# Patient Record
Sex: MALE | Race: WHITE
[De-identification: names, ages, dates, MRNs, and addresses within clinical notes are randomized per-mention and may not be internally consistent; named-entity substitution may affect disease eponyms.]

---

## 2017-03-29 ENCOUNTER — HOSPITAL ENCOUNTER (EMERGENCY)
Dept: HOSPITAL 11 - JP.ED | Age: 67
Discharge: HOME | End: 2017-03-29
Payer: MEDICARE

## 2017-03-29 VITALS — DIASTOLIC BLOOD PRESSURE: 78 MMHG | SYSTOLIC BLOOD PRESSURE: 127 MMHG

## 2017-03-29 DIAGNOSIS — Z79.899: ICD-10-CM

## 2017-03-29 DIAGNOSIS — R20.0: Primary | ICD-10-CM

## 2017-03-29 DIAGNOSIS — K21.9: ICD-10-CM

## 2017-03-29 DIAGNOSIS — E03.9: ICD-10-CM

## 2017-03-29 DIAGNOSIS — Z90.49: ICD-10-CM

## 2017-03-29 DIAGNOSIS — Z88.2: ICD-10-CM

## 2017-03-29 DIAGNOSIS — Z98.84: ICD-10-CM

## 2017-03-29 DIAGNOSIS — Z98.890: ICD-10-CM

## 2017-03-29 DIAGNOSIS — F17.210: ICD-10-CM

## 2017-03-29 NOTE — EDM.PDOC
ED HPI GENERAL MEDICAL PROBLEM





- General


Chief Complaint: General


Stated Complaint: FACE BECAME NUMB ON SUNDAY AND STILL SOME WEAKNESS


Time Seen by Provider: 03/29/17 11:46


Source of Information: Reports: Patient, Family, RN notes reviewed


History Limitations: Reports: No limitations





- History of Present Illness


INITIAL COMMENTS - FREE TEXT/NARRATIVE: 





66-year-old gentleman presents emergency department today with complaint of left

-sided facial numbness, states his symptoms initially started on Sunday he did 

have a headache at that time he describes as sharp and electric headache is now 

resolved he is very specific about the facial numbness is on half of his face 

left side above the lip and slightly below the nose does not incorporate the 

ear he says at certain points when he strokes his beard it can be very painful.

  No rash he has never had shingles before, does not complain of any focal 

neural deficits


  ** Left Hip


Pain Score (Numeric/FACES): 8





- Related Data


 Allergies











Allergy/AdvReac Type Severity Reaction Status Date / Time


 


Sulfa (Sulfonamide AdvReac  Hallucinati Verified 12/12/16 07:21





Antibiotics)   ons  











Home Meds: 


 Home Meds





Ca Carbonate/Vitamin D3/Vit K [Calcium + D Soft Chewable Tab] 1 tab PO BID 01/20

/15 [History]


Cyanocobalamin (Vitamin B-12) [B-12] 2,500 mcg PO DAILY 01/20/15 [History]


Gabapentin [Neurontin] 1,200 mg PO TID 01/20/15 [History]


Levothyroxine 125 mcg PO ACBRK 01/20/15 [History]


Multivitamin with Minerals [Multivitamins with Minerals] 1 each PO BID 01/20/15 

[History]


Sertraline [Zoloft] 100 mg PO DAILY 01/20/15 [History]


Vitamin B Complex [B Complex] 1 tab PO DAILY 01/20/15 [History]


DULoxetine HCl [Cymbalta] 90 mg PO DAILY 03/31/16 [History]


Doxepin [SINEquan] 10 mg PO BEDTIME 03/31/16 [History]


Finasteride [Proscar] 5 mg PO BEDTIME 03/31/16 [History]


Omeprazole [Omeprazole] 40 mg PO DAILY 03/31/16 [History]


Polyethylene Glycol 3350 [Miralax] 238 gm PO DAILY PRN 03/31/16 [History]


traMADol [Ultram] 50 - 100 mg PO Q6H PRN 03/31/16 [History]


Cyclobenzaprine [Flexeril] 10 mg PO TID PRN 12/08/16 [History]


L.acidoph,Paracasei, B.lactis [Probiotic] 1 cap PO DAILY 12/08/16 [History]


traZODone 50 - 100 mg PO BEDTIME 12/08/16 [History]











Past Medical History


HEENT History: Reports: Impaired vision, Other (see below)


Other HEENT History: wears glasses, infection in right submandibular gland


Gastrointestinal History: Reports: Bowel obstruction, Cholelithiasis, Colon 

polyp, Gastritis, GERD


Genitourinary History: Reports: Prostate disorder


Musculoskeletal History: Reports: Arthritis, Back pain, chronic, Fracture, Neck 

pain, chronic, Other (see below)


Other Musculoskeletal History: polyneuropathy


Neurological History: Reports: Concussion, CVA


Endocrine/Metabolic History: Reports: Hypothyroidism


Hematologic History: Reports: B12 deficiency





- Infectious Disease History


Infectious Disease History: Reports: Chicken pox, Measles


Other Infectious Disease History: MRSA 2012





- Past Surgical History


Head Surgeries/Procedures: Reports: None


HEENT Surgical History: Reports: Oral surgery


Cardiovascular Surgical History: Reports: Other (see below)


Other Cardiovascular Surgeries/Procedures: angiogram 2010


GI Surgical History: Reports: Bariatric procedure, Cholecystectomy, Colonoscopy

, EGD, Hernia, abdominal, Polypectomy, Small bowel, Other (see below)


Other GI Surgeries/Procedures: panniculectomy   bowel resection


Endocrine Surgical History: Reports: None


Neurological Surgical History: Reports: Spinal fusion, Other (see below)


Other Neurological Surgeries/Procedures: radiofrequency ablation neck ,     L4-

S1


Musculoskeletal Surgical History: Reports: Carpal tunnel, Other (see below)


Other Musculoskeletal Surgeries/Procedures:: rotator cuff ,  CTR,  elbow 

surgery   pin left big toe


Dermatological Surgical History: Reports: None





Social & Family History





- Family History


HEENT: Reports: None


Cardiac: Reports: Heart failure, Hypertension


Respiratory: Reports: Asthma, Other (see below)


Other Respiratory Family Hisory: emphysema


Oncologic: Reports: Breast, Lung, Lymphoma, Prostate, Other (see below)


Other Oncologic Family History: stomach





- Tobacco Use


Smoking Status *Q: Current Every Day Smoker


Years of Tobacco use: 50


Packs/Tins Daily: 1


Used Tobacco, but Quit: No


Second Hand Smoke Exposure: Yes





- Caffeine Use


Caffeine Use: Reports: Coffee


Other Caffeine Use: 2 pots a days





- Alcohol Use


Days Per Week of Alcohol Use: 0





- Recreational Drug Use


Recreational Drug Use: No





ED ROS GENERAL





- Review of Systems


Review Of Systems: See Below


Constitutional: Reports: no symptoms


HEENT: Reports: No symptoms


Respiratory: Reports: No Symptoms


Cardiovascular: Reports: No symptoms


GI/Abdominal: Reports: No symptoms


: Reports: no symptoms


Musculoskeletal: Reports: no symptoms


Neurological: Reports: Headache (Now resolve), Numbness, Tingling





ED EXAM, GENERAL





- Physical Exam


Exam: See Below


Free Text/Narrative:: 





Examination of the face I don't appreciate any rash very specific about the 

numbness tingling area is on the left side of the face top half of the lip is 

included bottom half is not inferior aspect of the nose up to the corner of the 

left eye the ear is spared and the remainder of the integument system is 

included consistent with a V2 dermatome


Exam Limited By: No limitations


General Appearance: alert, WD/WN, no apparent distress


Eye Exam: bilateral eye: EOMI, normal fundi, normal inspection


Nose: normal inspection, normal mucosa, no blood


Throat/Mouth: Normal inspection, Normal lips, Normal teeth, Normal gums, Normal 

oropharynx, Normal voice, No airway compromise


Head: atraumatic, normocephalic


Neck: normal inspection, supple, non-tender, full range of motion


Respiratory/Chest: no respiratory distress, lungs clear, normal breath sounds, 

no accessory muscle use


Cardiovascular: regular rate, rhythm, no murmur


Neurological: alert, oriented, CN II-XII intact, normal cognition, normal gait, 

no motor/sensory deficits





Course





- Vital Signs


Last Recorded V/S: 





 Last Vital Signs











Temp  98.6 F   03/29/17 11:04


 


Pulse  55 L  03/29/17 11:04


 


Resp  12   03/29/17 11:04


 


BP  127/78   03/29/17 11:04


 


Pulse Ox  98   03/29/17 11:04














Departure





- Departure


Time of Disposition: 12:18


Disposition: Home, Self-Care 01


Condition: good


Clinical Impression: 


 Facial numbness





Forms:  ED Department Discharge


Additional Instructions: 


Continue to watchful waiting, any sign of a rash develops please start the 

Valtrex, any vision symptoms that developed or become worse please follow up 

with her eye care provider, recommend recheck by your primary care provider in 

the next 2-3 days, call or return to the ED with worsening of symptoms





- Assessment/Plan


Plan: 





Assessment





Acuity = acute





Site and laterality = suspicious for early shingles given the specificity of 

the V2 involvement





Etiology  = probable varicella-zoster





Manifestations = numbness, tingling, pain V2 distribution left side





Location of injury =  home





Lab values = none





Plan


Prescription written for Valtrex 1 g by mouth 3 times a day x7 days and asked 

him to do close followup with his eye care provider if any symptoms develop, he 

is going to wait to start the prescription until he gets excited or rash, 

otherwise followup with primary care in 2-3 days for reevaluation

















Patient was in agreement with the plan all questions were answered, they were 

instructed to return to the emergency department or call for worsening 

symptoms.  This note was dictated using dragon voice recognition software 

please call with any questions.

## 2018-05-25 ENCOUNTER — HOSPITAL ENCOUNTER (EMERGENCY)
Dept: HOSPITAL 11 - JP.ED | Age: 68
Discharge: HOME | End: 2018-05-25
Payer: MEDICARE

## 2018-05-25 VITALS — DIASTOLIC BLOOD PRESSURE: 68 MMHG | SYSTOLIC BLOOD PRESSURE: 122 MMHG

## 2018-05-25 DIAGNOSIS — Z88.2: ICD-10-CM

## 2018-05-25 DIAGNOSIS — S93.401A: Primary | ICD-10-CM
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
## 2018-05-25 DIAGNOSIS — Z79.899: ICD-10-CM

## 2018-05-25 DIAGNOSIS — S83.91XA: ICD-10-CM

## 2018-05-25 DIAGNOSIS — E03.9: ICD-10-CM

## 2018-05-25 DIAGNOSIS — F17.210: ICD-10-CM

## 2018-05-25 PROCEDURE — 99284 EMERGENCY DEPT VISIT MOD MDM: CPT

## 2018-05-25 PROCEDURE — 96372 THER/PROPH/DIAG INJ SC/IM: CPT

## 2018-05-25 PROCEDURE — 73610 X-RAY EXAM OF ANKLE: CPT

## 2018-05-25 PROCEDURE — 73562 X-RAY EXAM OF KNEE 3: CPT

## 2018-05-25 NOTE — EDM.PDOC
ED HPI GENERAL MEDICAL PROBLEM





- General


Chief Complaint: Lower Extremity Injury/Pain


Stated Complaint: BOBCAT ACCIDENT


Time Seen by Provider: 05/25/18 15:45


Source of Information: Reports: Patient, EMS, Family


History Limitations: Reports: No Limitations





- History of Present Illness


INITIAL COMMENTS - FREE TEXT/NARRATIVE: 





Marvin presents today with complaints of right knee and right ankle pain after 

slipping and falling off his bobcat while hauling wood today at about 1400.


He reports he is not able to bear weight on his right foot. 


  ** Right Knee


Pain Score (Numeric/FACES): 8





- Related Data


 Allergies











Allergy/AdvReac Type Severity Reaction Status Date / Time


 


Sulfa (Sulfonamide AdvReac  Hallucinati Verified 05/25/18 15:36





Antibiotics)   ons  











Home Meds: 


 Home Meds





Ca Carbonate/Vitamin D3/Vit K [Calcium + D Soft Chewable Tab] 1 tab PO BID 01/20

/15 [History]


Cyanocobalamin (Vitamin B-12) [B-12] 2,500 mcg PO DAILY 01/20/15 [History]


Gabapentin [Neurontin] 1,200 mg PO TID 01/20/15 [History]


Levothyroxine 125 mcg PO ACBRK 01/20/15 [History]


Multivitamin with Minerals [Multivitamins with Minerals] 1 each PO BID 01/20/15 

[History]


Sertraline [Zoloft] 100 mg PO DAILY 01/20/15 [History]


Vitamin B Complex [B Complex] 1 tab PO DAILY 01/20/15 [History]


DULoxetine HCl [Cymbalta] 90 mg PO DAILY 03/31/16 [History]


Doxepin [SINEquan] 10 mg PO BEDTIME 03/31/16 [History]


Finasteride [Proscar] 5 mg PO BEDTIME 03/31/16 [History]


Omeprazole 40 mg PO DAILY 03/31/16 [History]


Polyethylene Glycol 3350 [Miralax] 238 gm PO DAILY PRN 03/31/16 [History]


traMADol [Ultram] 50 - 100 mg PO Q6H PRN 03/31/16 [History]


Cyclobenzaprine [Flexeril] 10 mg PO TID PRN 12/08/16 [History]


L.acidoph,Paracasei, B.lactis [Probiotic] 1 cap PO DAILY 12/08/16 [History]


traZODone 50 - 100 mg PO BEDTIME 12/08/16 [History]











Past Medical History


HEENT History: Reports: Impaired Vision, Other (See Below)


Other HEENT History: wears glasses, infection in right submandibular gland


Cardiovascular History: Reports: None


Respiratory History: Reports: Sleep Apnea


Gastrointestinal History: Reports: Bowel Obstruction, Cholelithiasis, Colon 

Polyp, Gastritis, GERD


Genitourinary History: Reports: Prostate Disorder


Musculoskeletal History: Reports: Arthritis, Back Pain, Chronic, Fracture, Neck 

Pain, Chronic


Other Musculoskeletal History: polyneuropathy


Neurological History: Reports: CVA


Endocrine/Metabolic History: Reports: Hypothyroidism


Hematologic History: Reports: B12 Deficiency





- Infectious Disease History


Infectious Disease History: Reports: Chicken Pox, Measles


Other Infectious Disease History: MRSA 2012





- Past Surgical History


HEENT Surgical History: Reports: Oral Surgery


GI Surgical History: Reports: Bariatric Procedure, Cholecystectomy, Colon, 

Colonoscopy, EGD, Hernia, Abdominal, Polypectomy, Small Bowel, Other (See Below)


Other GI Surgeries/Procedures: colon resection


Male  Surgical History: Reports: TURP-Transurethral Resection of Prostate


Neurological Surgical History: Reports: Spinal Fusion


Musculoskeletal Surgical History: Reports: Carpal Tunnel





Social & Family History





- Family History


HEENT: Reports: None


Cardiac: Reports: Heart Failure, Hypertension


Respiratory: Reports: Asthma, Other (See Below)


Other Respiratory Family Hisory: emphysema


Oncologic: Reports: Breast, Lung, Lymphoma, Prostate, Other (See Below)


Other Oncologic Family History: stomach





- Tobacco Use


Smoking Status *Q: Heavy Tobacco Smoker


Years of Tobacco use: 50


Packs/Tins Daily: 1





- Caffeine Use


Caffeine Use: Reports: Coffee


Other Caffeine Use: 2 pots a days





- Recreational Drug Use


Recreational Drug Use: No





Review of Systems





- Review of Systems


Review Of Systems: See Below


Constitutional: Reports: No Symptoms


Eyes: Reports: No Symptoms


Ears: Reports: No Symptoms


Nose: Reports: No Symptoms


Mouth/Throat: Reports: No Symptoms


Respiratory: Reports: No Symptoms


Cardiovascular: Reports: No Symptoms


GI/Abdominal: Reports: No Symptoms


Genitourinary: Reports: No Symptoms


Musculoskeletal: Reports: Foot Pain, Joint Pain, Other (Right knee and ankle 

pain)


Skin: Reports: No Symptoms


Neurological: Reports: Other (He suffers from chronic polyneuropathy, numbness 

to feet. )


Psychiatric: Reports: No Symptoms





ED EXAM, GENERAL





- Physical Exam


Exam: See Below


Free Text/Narrative:: 





Mr. Lawrence presents today with complaints of pain to right knee and ankle after 

twisting injury when he slipped and fell on his bobcat at 1400 today.  He 

denies LOC, head injury or other concerns.  He reports use of oxycontin works 

good for his pain. 


General Appearance: Alert, WD/WN, Moderate Distress


Eye Exam: Bilateral Eye: EOMI, Normal Inspection, PERRL


Ears: Normal External Exam, Normal Canal, Hearing Grossly Normal, Normal TMs


Ear Exam: Bilateral Ear: TM normal


Nose: Normal Inspection, Normal Mucosa, No Blood


Throat/Mouth: Normal Inspection, Normal Lips, Normal Gums, Normal Oropharynx, 

Normal Voice, No Airway Compromise


Head: Atraumatic, Normocephalic


Neck: Normal Inspection, Supple, Non-Tender, Full Range of Motion.  No: 

Lymphadenopathy (R), Lymphadenopathy (L)


Respiratory/Chest: No Respiratory Distress, Lungs Clear, Normal Breath Sounds, 

No Accessory Muscle Use, Chest Non-Tender


Cardiovascular: Normal Peripheral Pulses, Regular Rate, Rhythm, No Edema, No 

Gallop, No Murmur, No Rub


Peripheral Pulses: 2+: Radial (L), Radial (R), Dorsalis Pedis (L), Dorsalis 

Pedis (R)


GI/Abdominal: Normal Bowel Sounds, Soft, Non-Tender, No Organomegaly, No 

Distention, No Mass, Pelvis Stable


Back Exam: Normal Inspection, Full Range of Motion.  No: CVA Tenderness (R), 

CVA Tenderness (L)


Extremities: No Pedal Edema, Normal Capillary Refill, Leg Pain, Limited Range 

of Motion, Other (Pain to medial right knee and medial right ankle.  Increase 

in pain with movement, flexion and extension.  ).  No: Increased Warmth, Mottled

, Redness


Neurological: Alert, Oriented, CN II-XII Intact, Normal Cognition, Normal 

Reflexes, No Motor/Sensory Deficits


Psychiatric: Normal Affect, Normal Mood


Skin Exam: Warm, Dry, Intact, Normal Color, No Rash.  No: Ecchymosis, Erythema, 

Increased Warmth


Lymphatic: No Adenopathy





Course





- Vital Signs


Last Recorded V/S: 


 Last Vital Signs











Temp  36.7 C   05/25/18 15:33


 


Pulse  58 L  05/25/18 15:33


 


Resp  18   05/25/18 15:33


 


BP  122/68   05/25/18 15:33


 


Pulse Ox  96   05/25/18 15:33














- Orders/Labs/Meds


Orders: 


 Active Orders 24 hr











 Category Date Time Status


 


 Ankle Min 3V Rt [CR] Stat Exams  05/25/18 16:06 Taken


 


 Knee 3V Rt [CR] Stat Exams  05/25/18 16:06 Taken











Meds: 


Medications














Discontinued Medications














Generic Name Dose Route Start Last Admin





  Trade Name Abdoulaye  PRN Reason Stop Dose Admin


 


Hydrocodone Bitart/Acetaminophen  1 tab  05/25/18 17:19  





  Norco 325-5 Mg  PO  05/25/18 17:20  





  ONETIME ONE   





     





     





     





     


 


Hydromorphone HCl  1 mg  05/25/18 16:06  05/25/18 16:22





  Dilaudid  IM  05/25/18 16:07  1 mg





  ONETIME ONE   Administration





     





     





     





     














- Radiology Interpretation


Free Text/Narrative:: 





X-rays of right knee and right ankle reviewed.


No acute findings noted.


X-rays reviewed with Dr. Colón, she is in agreement.





Radiologist read pending. 








Departure





- Departure


Time of Disposition: 17:29


Disposition: Home, Self-Care 01


Condition: Good


Clinical Impression: 


 Right ankle sprain, Right knee pain








- Discharge Information


Instructions:  Ankle Sprain, Easy-to-Read, Knee Sprain, Adult, Easy-to-Read


Referrals: 


Maksim Perez NP [Primary Care Provider] - 


Forms:  ED Department Discharge


Additional Instructions: 


You have been evaluated and treated for right knee sprain and right ankle 

sprain in the emergency room


Secondary to fall from Bobcat.





Rest, ice, elevation and compression will help the best. 





Keep the right leg elevated to minimize swelling and pain. 





Use ace wraps for compression to help with swelling and pain.





Take ibuprofen 600mg to 800mg by mouth three times a day as needed for pain.


You can also take acetaminophen 1000mg by mouth three times a day for pain. 





Wear Cam-walker boot for ankle sprain. 





Hard copy script provided for short right knee stabilizer - neoprene.  Fill 

this at nearest pharmacy for support.





Use a walker to assist with ambulation until pain decreases. 





Follow up with Dr. AJ Herndon ORTHO next week for further management.





Return for worsening, issues or concerns. 





- My Orders


Last 24 Hours: 


My Active Orders





05/25/18 16:06


Ankle Min 3V Rt [CR] Stat 


Knee 3V Rt [CR] Stat 














- Assessment/Plan


Last 24 Hours: 


My Active Orders





05/25/18 16:06


Ankle Min 3V Rt [CR] Stat 


Knee 3V Rt [CR] Stat 











Assessment:: 





Right knee sprain


Right ankle sprain


Plan: 





 Patient evaluated and treated for right knee sprain and right ankle sprain in 

the emergency room


Secondary to fall from Bobcat.





Rest, ice, elevation and compression will help the best. 





Keep the right leg elevated to minimize swelling and pain. 





Use ace wraps for compression to help with swelling and pain.





Take ibuprofen 600mg to 800mg by mouth three times a day as needed for pain.


He can also take acetaminophen 1000mg by mouth three times a day for pain. 





Wear Cam-walker boot for ankle sprain. 





Hard copy script provided for short right knee stabilizer - neoprene.  Fill 

this at nearest pharmacy for support.





Use a walker to assist with ambulation until pain decreases. 





Follow up with Dr. AJ Herndon ORTHO next week for further management.





Return for worsening, issues or concerns.

## 2018-05-29 NOTE — CR
Findings: Degenerative changes at the ankle. Osteophyte or loose body at the talar neck dorsal aspect
. Calcification of the plantar fascia and Achilles tendon. No fracture.

## 2018-05-29 NOTE — CR
Large effusion or synovitis. Chondrocalcinosis. Arthritic change greatest at the patellofemoral tyler
rtment. No fracture.

## 2018-06-25 ENCOUNTER — HOSPITAL ENCOUNTER (OUTPATIENT)
Dept: HOSPITAL 11 - JP.SDS | Age: 68
Discharge: HOME | End: 2018-06-25
Attending: SURGERY
Payer: MEDICARE

## 2018-06-25 VITALS — SYSTOLIC BLOOD PRESSURE: 135 MMHG | DIASTOLIC BLOOD PRESSURE: 76 MMHG

## 2018-06-25 DIAGNOSIS — R12: ICD-10-CM

## 2018-06-25 DIAGNOSIS — R10.13: Primary | ICD-10-CM

## 2018-06-25 PROCEDURE — 0DJ08ZZ INSPECTION OF UPPER INTESTINAL TRACT, VIA NATURAL OR ARTIFICIAL OPENING ENDOSCOPIC: ICD-10-PCS | Performed by: SURGERY

## 2018-06-25 PROCEDURE — 43235 EGD DIAGNOSTIC BRUSH WASH: CPT

## 2018-06-26 NOTE — OR
DATE OF PROCEDURE:  06/25/2018

 

PREOPERATIVE DIAGNOSES:  Epigastric pain and heartburn.

 

POSTOPERATIVE DIAGNOSES:  Epigastric pain and heartburn, status post Owen-en-Y gastric

bypass with;

1. Possible thrush over tongue.

2. Otherwise normal examination.

 

OPERATIVE PROCEDURE:  Upper GI endoscopy.

 

ANESTHESIA:  IV sedation.

 

INDICATION FOR PROCEDURE:  The patient presents with some complaints of epigastric

discomfort and heartburn, along with a dry and somewhat thickened feeling over this tongue.

The patient has been on omeprazole 40 mg daily.  He had been on Carafate, but found that not

tolerable.  The plan is to proceed with upper GI endoscopy with biopsies as indicated.

Potential risks including bleeding and perforation were discussed, and the patient wishes to

proceed.

 

DETAILS OF PROCEDURE:  The patient was taken to the operating room and placed in a left

lateral decubitus position.  IV sedation was administered, after which the upper GI

endoscope was passed orally through the length of the esophagus and into the gastric pouch

and from there through the gastrojejunostomy roughly 30 cm further into the Owen limb.  The

patient was noted to have a thickened and somewhat widened area over the surface of the

tongue, otherwise, subsequent examination below that was entirely normal.  There were no

abnormalities of the hypopharynx, larynx, or upper esophageal sphincter.  The esophageal

body and EG junction were entirely free of any gross inflammation.  The gastric pouch

likewise was not inflamed, and there was no stricturing or problems noted at the

gastrojejunostomy.  The mucosa throughout this entire exam was entirely normal below the

tongue.  Passing the scope quite a bit into the Owen limb, there was no bile or other signs

of partial small bowel obstruction distally.  The scope was then withdrawn, and the

procedure then concluded.  The patient was taken to the recovery room in a satisfactory

condition.

 

The patient will be given a course of Mycostatin swish and swallow 5 mL q.i.d. for 5 days

for possible thrush.  Otherwise, continue the present medications.  He will be following up

with Keeley Banda in one week.  If he continues to have symptoms of heartburn or dysphasia,

a course of Levsin may be warranted at that point.

 

The patient was also complaining of some nebulous symptoms suggestive of TIAs.  This

apparently has received some workup in the recent past, and Keeley Banda will be looking

into that in terms of what needs to be done in terms of further evaluation, workup and

referrals.

 

 

 

 

Kp Herndon MD

DD:  06/25/2018 19:19:36

DT:  06/26/2018 09:14:06

Job #:  [id number]

## 2019-03-08 ENCOUNTER — HOSPITAL ENCOUNTER (OUTPATIENT)
Dept: HOSPITAL 11 - JP.SDS | Age: 69
Discharge: HOME | End: 2019-03-08
Attending: SURGERY
Payer: MEDICARE

## 2019-03-08 VITALS — DIASTOLIC BLOOD PRESSURE: 72 MMHG | SYSTOLIC BLOOD PRESSURE: 161 MMHG

## 2019-03-08 DIAGNOSIS — F32.9: ICD-10-CM

## 2019-03-08 DIAGNOSIS — E03.9: ICD-10-CM

## 2019-03-08 DIAGNOSIS — K21.9: ICD-10-CM

## 2019-03-08 DIAGNOSIS — R11.0: Primary | ICD-10-CM

## 2019-03-08 DIAGNOSIS — F17.200: ICD-10-CM

## 2019-03-08 DIAGNOSIS — R63.0: ICD-10-CM

## 2019-03-08 DIAGNOSIS — C25.0: ICD-10-CM

## 2019-03-08 DIAGNOSIS — Z86.73: ICD-10-CM

## 2019-03-08 DIAGNOSIS — R63.4: ICD-10-CM

## 2019-03-08 DIAGNOSIS — Z98.84: ICD-10-CM

## 2019-03-08 PROCEDURE — C1751 CATH, INF, PER/CENT/MIDLINE: HCPCS

## 2019-03-08 PROCEDURE — 43239 EGD BIOPSY SINGLE/MULTIPLE: CPT

## 2019-03-08 PROCEDURE — 87081 CULTURE SCREEN ONLY: CPT

## 2019-03-15 NOTE — OR
DATE OF PROCEDURE:  03/08/2019

 

SURGEON:  Kp Herndon MD

 

PREOPERATIVE DIAGNOSIS:  History of pancreatic carcinoma with ongoing nausea and weight

loss.

 

POSTOPERATIVE DIAGNOSIS:  Normal endoscopic examination, status post Owen-en-Y gastric

bypass.

 

OPERATIVE PROCEDURE:  Upper GI endoscopy with biopsy of gastric pouch for CLOtest.

 

ANESTHESIA:  IV sedation.

 

INDICATION FOR PROCEDURE:  This is a 68-year-old, recently diagnosed with adenocarcinoma of

pancreatic head.  He is beginning neoadjuvant treatment next week.  He has had problems with

nausea and weight loss, and he is undergoing upper GI endoscopy for evaluation of his upper

GI status.  He is status post previous Owen-en-Y gastric bypass.  Potential risks including

bleeding and perforation were discussed, and the patient wishes to proceed.

 

DETAILS OF PROCEDURE:  The patient was taken to the operating room and placed in a left

lateral decubitus position.  IV sedation was administered, after which the upper GI

endoscope was passed orally through the length of the esophagus, through the esophagogastric

junction into the gastric pouch, and from there through the gastrojejunostomy roughly 20 cm

into the Owen limb.

 

Overall, there were no abnormalities at all during the examination.  There were no areas of

mucosal inflammation, no stricturing, and no reflux of bile or signs of more distal bowel

obstruction.  Biopsies were then obtained from the gastric pouch and sent for CLOtest for H.

pylori.  Minimal bleeding from the biopsy sites was seen, and the procedure was then

concluded.

 

At this point, the patient's nausea and anorexia are likely tumor related factors, as well

as his jaundiced state.  The plan will be to give the patient Zofran 4 mg ODT t.i.d. to be

taken around 30 minutes before meals.  We will also give an additional liter of lactate

Ringer's with MVI trace elements prior to discharge today.  He will be following with

Medical-Oncology next week.  The patient is quite jaundiced at this point, and the hope is

that the neoadjuvant treatment will treat the tumor enough that he opens up his common bile

duct to some extent.  If this fails to occur over the next relatively short period, one

might consider placing an endoscopic stent across the common bile duct to alleviate the

jaundice.

 

 

 

 

Kp Herndon MD

DD:  03/14/2019 11:43:10

DT:  03/14/2019 21:04:28

Job #:  [id number]

## 2019-04-03 ENCOUNTER — HOSPITAL ENCOUNTER (INPATIENT)
Dept: HOSPITAL 11 - JP.ED | Age: 69
LOS: 3 days | Discharge: HOME | DRG: 445 | End: 2019-04-06
Attending: INTERNAL MEDICINE | Admitting: INTERNAL MEDICINE
Payer: MEDICARE

## 2019-04-03 DIAGNOSIS — Z66: ICD-10-CM

## 2019-04-03 DIAGNOSIS — Z91.09: ICD-10-CM

## 2019-04-03 DIAGNOSIS — Z98.1: ICD-10-CM

## 2019-04-03 DIAGNOSIS — F41.9: ICD-10-CM

## 2019-04-03 DIAGNOSIS — R50.9: Primary | ICD-10-CM

## 2019-04-03 DIAGNOSIS — F17.210: ICD-10-CM

## 2019-04-03 DIAGNOSIS — G47.30: ICD-10-CM

## 2019-04-03 DIAGNOSIS — M54.9: ICD-10-CM

## 2019-04-03 DIAGNOSIS — M19.90: ICD-10-CM

## 2019-04-03 DIAGNOSIS — R11.0: ICD-10-CM

## 2019-04-03 DIAGNOSIS — N42.9: ICD-10-CM

## 2019-04-03 DIAGNOSIS — E03.9: ICD-10-CM

## 2019-04-03 DIAGNOSIS — Z79.82: ICD-10-CM

## 2019-04-03 DIAGNOSIS — Z86.14: ICD-10-CM

## 2019-04-03 DIAGNOSIS — Z92.21: ICD-10-CM

## 2019-04-03 DIAGNOSIS — K83.09: ICD-10-CM

## 2019-04-03 DIAGNOSIS — E53.8: ICD-10-CM

## 2019-04-03 DIAGNOSIS — H54.7: ICD-10-CM

## 2019-04-03 DIAGNOSIS — Z86.73: ICD-10-CM

## 2019-04-03 DIAGNOSIS — Z98.84: ICD-10-CM

## 2019-04-03 DIAGNOSIS — E78.00: ICD-10-CM

## 2019-04-03 DIAGNOSIS — M54.2: ICD-10-CM

## 2019-04-03 DIAGNOSIS — C25.0: ICD-10-CM

## 2019-04-03 DIAGNOSIS — G89.29: ICD-10-CM

## 2019-04-03 DIAGNOSIS — Z88.2: ICD-10-CM

## 2019-04-03 DIAGNOSIS — K21.9: ICD-10-CM

## 2019-04-03 PROCEDURE — 99285 EMERGENCY DEPT VISIT HI MDM: CPT

## 2019-04-03 PROCEDURE — 87804 INFLUENZA ASSAY W/OPTIC: CPT

## 2019-04-03 PROCEDURE — 71046 X-RAY EXAM CHEST 2 VIEWS: CPT

## 2019-04-03 PROCEDURE — 82150 ASSAY OF AMYLASE: CPT

## 2019-04-03 PROCEDURE — 81001 URINALYSIS AUTO W/SCOPE: CPT

## 2019-04-03 PROCEDURE — 83605 ASSAY OF LACTIC ACID: CPT

## 2019-04-03 PROCEDURE — 85025 COMPLETE CBC W/AUTO DIFF WBC: CPT

## 2019-04-03 PROCEDURE — 87040 BLOOD CULTURE FOR BACTERIA: CPT

## 2019-04-03 PROCEDURE — 36415 COLL VENOUS BLD VENIPUNCTURE: CPT

## 2019-04-03 PROCEDURE — C9113 INJ PANTOPRAZOLE SODIUM, VIA: HCPCS

## 2019-04-03 PROCEDURE — 86140 C-REACTIVE PROTEIN: CPT

## 2019-04-03 PROCEDURE — 83690 ASSAY OF LIPASE: CPT

## 2019-04-03 PROCEDURE — 80053 COMPREHEN METABOLIC PANEL: CPT

## 2019-04-03 PROCEDURE — 85610 PROTHROMBIN TIME: CPT

## 2019-04-03 PROCEDURE — 96365 THER/PROPH/DIAG IV INF INIT: CPT

## 2019-04-03 PROCEDURE — 83735 ASSAY OF MAGNESIUM: CPT

## 2019-04-03 NOTE — EDM.PDOC
ED HPI GENERAL MEDICAL PROBLEM





- General


Chief Complaint: Fever


Stated Complaint: FEVER, NAUSEA


Time Seen by Provider: 04/03/19 22:50


Source of Information: Reports: Patient, Old Records


History Limitations: Reports: Other (incomplete records)





- History of Present Illness


INITIAL COMMENTS - FREE TEXT/NARRATIVE: 





69 yo male with pancreatic CA presents with a fever since midday yesterday. Was 

in to the clinic yesterday morning for a different issue and has not reported 

his current illness to his primary. Called the provider group who tx's his CA 

and was told to come right in to the ER and to not take any of his oral meds 

before arrival, he did not ask why he shouldn't take these meds. He denies any 

other sx's other than a mild sore throat currently. Did have some mild nausea 

earlier. 





Has a Powerport since 2/28/19 placed at [place]. Is followed for his CA by 

[facility]. 


Onset: Gradual


Onset Date: 04/02/19


Duration: Day(s): (1.5), Constant


Location: Reports: Generalized


Quality: Reports: Other (no pain)


Severity: Mild


Improves with: Reports: None


Worsens with: Reports: Other (? time)


Context: Reports: Other (see HPI)


Associated Symptoms: Reports: Fever/Chills, Nausea/Vomiting (no vomiting).  

Denies: Chest Pain, Cough, Headaches, Rash, Shortness of Breath


Treatments PTA: Reports: Other (see below) (none)





- Related Data


 Allergies











Allergy/AdvReac Type Severity Reaction Status Date / Time


 


Sulfa (Sulfonamide AdvReac  Hallucinati Verified 04/03/19 23:00





Antibiotics)   ons  


 


environmental Allergy  Hives Uncoded 04/03/19 23:00











Home Meds: 


 Home Meds





Ca Carbonate/Vitamin D3/Vit K [Calcium + D Soft Chewable Tab] 1 tab PO BID 01/20

/15 [History]


Cyanocobalamin (Vitamin B-12) [B-12] 2,500 mcg PO DAILY 01/20/15 [History]


Gabapentin [Neurontin] 600 mg PO BID 01/20/15 [History]


Levothyroxine 125 mcg PO ACBRK 01/20/15 [History]


Multivitamin with Minerals [Multivitamins with Minerals] 1 each PO BID 01/20/15 

[History]


Sertraline [Zoloft] 100 mg PO DAILY 01/20/15 [History]


Vitamin B Complex [B Complex] 1 tab PO DAILY 01/20/15 [History]


DULoxetine HCl [Cymbalta] 60 mg PO BID 03/31/16 [History]


Omeprazole 40 mg PO BID 03/31/16 [History]


Cyclobenzaprine [Flexeril] 10 mg PO TID PRN 12/08/16 [History]


Acetaminophen 650 mg PO Q4H PRN 06/21/18 [History]


Calcium Carbonate [Calcium] 500 mg PO DAILY PRN 06/21/18 [History]


EPINEPHrine [Epipen 2-Rich] 0.3 ml IM ASDIRECTED 06/21/18 [History]


rOPINIRole [Requip] 1 mg PO BEDTIME 06/21/18 [History]


Polyethylene Glycol 3350 17 g PO QPM 03/06/19 [History]


Prochlorperazine Maleate [Compazine] 10 mg PO Q6HR PRN 03/06/19 [History]


Venlafaxine [Effexor] 75 mg PO DAILY 03/06/19 [History]


oxyCODONE 10 mg PO Q4HR PRN 03/06/19 [History]











Past Medical History


HEENT History: Reports: Impaired Vision, Other (See Below)


Other HEENT History: wears glasses, infection in right submandibular gland


Cardiovascular History: Reports: High Cholesterol


Respiratory History: Reports: Sleep Apnea


Gastrointestinal History: Reports: Bowel Obstruction, Cholelithiasis, Colon 

Polyp, Gastritis, GERD, Jaundice


Genitourinary History: Reports: Prostate Disorder


Musculoskeletal History: Reports: Arthritis, Back Pain, Chronic, Fracture, Neck 

Pain, Chronic, Other (See Below)


Other Musculoskeletal History: polyneuropathy.  R knee/ankle sprain


Neurological History: Reports: CVA


Psychiatric History: Reports: Anxiety


Endocrine/Metabolic History: Reports: Hypothyroidism, Obesity/BMI 30+


Hematologic History: Reports: B12 Deficiency


Oncologic (Cancer) History: Reports: Pancreatic





- Infectious Disease History


Infectious Disease History: Reports: Chicken Pox, MRSA


Other Infectious Disease History: MRSA 2012





- Past Surgical History


HEENT Surgical History: Reports: Oral Surgery


Respiratory Surgical History: Reports: None


GI Surgical History: Reports: Bariatric Procedure, Colonoscopy, EGD, Hernia 

Repair/Other


Other GI Surgeries/Procedures: colon resection; Panniculectomy


Male  Surgical History: Reports: TURP-Transurethral Resection of Prostate


Endocrine Surgical History: Reports: Other (See Below)


Other Endocrine Surgeries/Procedures: Salvitory gland


Neurological Surgical History: Reports: Spinal Fusion


Musculoskeletal Surgical History: Reports: Carpal Tunnel, Shoulder Surgery, 

Other (See Below)


Other Musculoskeletal Surgeries/Procedures:: left rotator cuff repair





Social & Family History





- Family History


Family Medical History: Noncontributory


HEENT: Reports: None


Cardiac: Reports: Heart Failure, Hypertension


Respiratory: Reports: Asthma, Other (See Below)


Other Respiratory Family Hisory: emphysema


Oncologic: Reports: Breast, Lung, Lymphoma, Prostate, Other (See Below)


Other Oncologic Family History: stomach





- Caffeine Use


Caffeine Use: Reports: Coffee


Other Caffeine Use: 2 pots a days





ED ROS GENERAL





- Review of Systems


Review Of Systems: See Below


Constitutional: Reports: Fever, Chills, Malaise


HEENT: Reports: Throat Pain (minimal).  Denies: Ear Pain, Eye Discharge, 

Rhinitis, Throat Swelling


Respiratory: Reports: No Symptoms


Cardiovascular: Reports: No Symptoms


GI/Abdominal: Reports: Abdominal Pain (chronic, not worse), Nausea (better now)

.  Denies: Black Stool, Bloody Stool, Diarrhea, Vomiting


: Reports: No Symptoms


Musculoskeletal: Reports: No Symptoms


Skin: Reports: No Symptoms


Neurological: Reports: No Symptoms





ED EXAM, SEPSIS





- Physical Exam


Exam: See Below


Exam Limited By: No Limitations


General Appearance: Alert, WD/WN, No Apparent Distress


Eye Exam: Bilateral Eye: Normal Inspection


Ears: Normal External Exam, Normal Canal, Hearing Grossly Normal, Normal TMs


Nose: Normal Inspection, No Blood


Throat/Mouth: Normal Inspection, Normal Lips, Normal Oropharynx, Normal Voice, 

No Airway Compromise


Head: Atraumatic, Normocephalic


Neck: Normal Inspection, Non-Tender


Respiratory/Chest: No Respiratory Distress, Lungs Clear, Normal Breath Sounds, 

No Accessory Muscle Use


Cardiovascular: Regular Rate, Rhythm, No Edema


GI/Abdominal Exam: Normal Bowel Sounds, Soft, Non-Tender, No Distention.  No: 

Distended, Guarding, Rigid, Rebound, Tender


Back: Normal Inspection.  No: CVA Tenderness (R), CVA Tenderness (L)


Extremities: Normal Inspection, Normal Range of Motion, Non-Tender, No Pedal 

Edema


Neurological: Alert, Oriented, CN II-XII Intact, Normal Cognition, No Motor/

Sensory Deficits


Psychiatric: Normal Affect, Normal Mood


Skin: Warm, Dry, Intact, Normal Color, No Rash


Lymphatic: Bilateral: No Adenopathy





Course





- Vital Signs


Text/Narrative:: 





Spoke with [facility] oncology @ 1244h, Dr. Andrea Chavez aware @ 12:55 am


Last Recorded V/S: 


 Last Vital Signs











Temp  37.4 C   04/04/19 00:37


 


Pulse  95   04/04/19 00:37


 


Resp  14   04/04/19 00:37


 


BP  100/52 L  04/04/19 00:37


 


Pulse Ox  95   04/04/19 00:37














- Orders/Labs/Meds


Orders: 


 Active Orders 24 hr











 Category Date Time Status


 


 CULTURE BLOOD [BC] Stat Lab  04/03/19 23:55 Received


 


 CULTURE BLOOD [BC] Stat Lab  04/04/19 00:44 Ordered


 


 Vancomycin 1.75 gm Med  04/04/19 01:02 Ordered





 Sodium Chloride 0.9% [Normal Saline] 250 ml   





 IV NOW   











Labs: 


 Laboratory Tests











  04/03/19 04/03/19 04/04/19 Range/Units





  23:10 23:10 00:08 


 


WBC  19.5 H    (4.5-11.0)  K/uL


 


RBC  3.24 L    (4.30-5.90)  M/uL


 


Hgb  10.0 L D    (12.0-15.0)  g/dL


 


Hct  30.9 L    (40.0-54.0)  %


 


MCV  95    (80-98)  fL


 


MCH  31    (27-31)  pg


 


MCHC  32    (32-36)  %


 


Plt Count  152    (150-400)  K/uL


 


C-Reactive Protein   6.59 H   (0.0-0.3)  mg/dL


 


Urine Color    Yellow  


 


Urine Appearance    Clear  


 


Urine pH    8.0  (4.5-8.0)  


 


Ur Specific Gravity    1.010  (1.008-1.030)  


 


Urine Protein    Negative  (NEGATIVE)  mg/dL


 


Urine Glucose (UA)    Normal  (NEGATIVE)  mg/dL


 


Urine Ketones    Negative  (NEGATIVE)  mg/dL


 


Urine Occult Blood    Negative  (NEGATIVE)  


 


Urine Nitrite    Negative  (NEGAITVE)  


 


Urine Bilirubin    Negative  (NEGATIVE)  


 


Urine Urobilinogen    Normal  (NORMAL)  mg/dL


 


Ur Leukocyte Esterase    Negative  (NEGATIVE)  


 


Urine RBC    0-5  (0-5)  


 


Urine WBC    0-5  (0-5)  


 


Ur Epithelial Cells    Rare  


 


Amorphous Sediment    Not seen  


 


Urine Bacteria    Few  


 


Urine Mucus    Not seen  











Meds: 


Medications














Discontinued Medications














Generic Name Dose Route Start Last Admin





  Trade Name Freq  PRN Reason Stop Dose Admin


 


Acetaminophen  1,000 mg  04/03/19 22:57  04/03/19 23:20





  Tylenol Extra Strength  PO  04/03/19 22:58  1,000 mg





  ONETIME ONE   Administration





     





     





     





     


 


Lactated Ringer's  1,000 mls @ 1,000 mls/hr  04/03/19 22:49  





  Ringers, Lactated  IV  04/03/19 23:48  





  BOLUS ONE   





     





     





     





     


 


Ondansetron HCl  4 mg  04/03/19 22:27  





  Zofran Odt  PO  04/03/19 22:28  





  ONETIME ONE   





     





     





     





     


 


Oxycodone HCl  10 mg  04/03/19 22:57  04/03/19 23:21





  Oxycodone  PO  04/03/19 22:58  10 mg





  ONETIME ONE   Administration





     





     





     





     














- Radiology Interpretation


Free Text/Narrative:: 





CXR-negative





Departure





- Departure


Time of Disposition: 01:10


Disposition: Admitted As Inpatient 67


Condition: Fair


Clinical Impression: 


 Bacteremia





Central line-associated bloodstream infection


Qualifiers:


 Encounter type: initial encounter Qualified Code(s): T80.211A - Bloodstream 

infection due to central venous catheter, initial encounter








- Discharge Information


*PRESCRIPTION DRUG MONITORING PROGRAM REVIEWED*: No


*COPY OF PRESCRIPTION DRUG MONITORING REPORT IN PATIENT MIGDALIA: No


Referrals: 


Maksim Perez NP [Primary Care Provider] - 


Forms:  ED Department Discharge





- My Orders


Last 24 Hours: 


My Active Orders





04/03/19 23:55


CULTURE BLOOD [BC] Stat 





04/04/19 00:44


CULTURE BLOOD [BC] Stat 





04/04/19 01:02


Vancomycin 1.75 gm   Sodium Chloride 0.9% [Normal Saline] 250 ml IV NOW 














- Assessment/Plan


Last 24 Hours: 


My Active Orders





04/03/19 23:55


CULTURE BLOOD [BC] Stat 





04/04/19 00:44


CULTURE BLOOD [BC] Stat 





04/04/19 01:02


Vancomycin 1.75 gm   Sodium Chloride 0.9% [Normal Saline] 250 ml IV NOW

## 2019-04-04 RX ADMIN — ALUMINUM HYDROXIDE, MAGNESIUM HYDROXIDE, AND SIMETHICONE SCH ML: 200; 200; 20 SUSPENSION ORAL at 21:41

## 2019-04-04 RX ADMIN — Medication SCH CAP: at 20:03

## 2019-04-04 RX ADMIN — MORPHINE SULFATE SCH MG: 15 TABLET, EXTENDED RELEASE ORAL at 18:18

## 2019-04-04 RX ADMIN — ALUMINUM HYDROXIDE, MAGNESIUM HYDROXIDE, AND SIMETHICONE SCH ML: 200; 200; 20 SUSPENSION ORAL at 17:38

## 2019-04-04 RX ADMIN — ALUMINUM HYDROXIDE, MAGNESIUM HYDROXIDE, AND SIMETHICONE SCH ML: 200; 200; 20 SUSPENSION ORAL at 11:58

## 2019-04-04 RX ADMIN — TAZOBACTAM SODIUM AND PIPERACILLIN SODIUM SCH MLS/HR: 375; 3 INJECTION, SOLUTION INTRAVENOUS at 18:16

## 2019-04-04 RX ADMIN — Medication SCH CAP: at 12:03

## 2019-04-04 RX ADMIN — Medication SCH ML: at 12:03

## 2019-04-04 RX ADMIN — PANCRELIPASE SCH CAP: 60000; 12000; 38000 CAPSULE, DELAYED RELEASE PELLETS ORAL at 18:18

## 2019-04-04 RX ADMIN — CYANOCOBALAMIN TAB 1000 MCG SCH MCG: 1000 TAB at 08:31

## 2019-04-04 RX ADMIN — TAZOBACTAM SODIUM AND PIPERACILLIN SODIUM SCH MLS/HR: 375; 3 INJECTION, SOLUTION INTRAVENOUS at 13:26

## 2019-04-04 NOTE — PCM.PN
- General Info


Date of Service: 04/04/19


Subjective Update: 





Mr. Lawrence is a 68-year-old gentleman admitted through the emergency department 

last night with increased abdominal pain, fever, nausea and vomiting. He has a 

known diagnosis of pancreatic carcinoma and has been receiving chemotherapy. 

White blood cell count was elevated on admission but he has received a dose of 

Neupogen yesterday. He has felt better since admission, white blood cell count 

remains elevated. He is status post placement of a Vazquez catheter on the 

right and has a percutaneous drainage tube through the wall of the abdomen.


Functional Status: Reports: Pain Controlled, Urinating





- Review of Systems


General: Reports: Fever, Weakness, Chills


Pulmonary: Reports: No Symptoms


Cardiovascular: Reports: No Symptoms


Gastrointestinal: Reports: Abdominal Pain, Decreased Appetite, Nausea, 

Vomiting.  Denies: Diarrhea, Difficulty Swallowing


Genitourinary: Reports: No Symptoms





- Patient Data


Vitals - Most Recent: 


 Last Vital Signs











Temp  98.9 F   04/04/19 07:00


 


Pulse  96   04/04/19 07:00


 


Resp  18   04/04/19 07:00


 


BP  103/61   04/04/19 07:00


 


Pulse Ox  95   04/04/19 07:00











Weight - Most Recent: 220 lb 5.985 oz


I&O - Last 24 Hours: 


 Intake & Output











 04/03/19 04/04/19 04/04/19





 22:59 06:59 14:59


 


Intake Total  256 200


 


Output Total  695 425


 


Balance  -439 -225











Lab Results Last 24 Hours: 


 Laboratory Results - last 24 hr











  04/03/19 04/03/19 04/04/19 Range/Units





  23:10 23:10 00:01 


 


WBC  19.5 H    (4.5-11.0)  K/uL


 


RBC  3.24 L    (4.30-5.90)  M/uL


 


Hgb  10.0 L D    (12.0-15.0)  g/dL


 


Hct  30.9 L    (40.0-54.0)  %


 


MCV  95    (80-98)  fL


 


MCH  31    (27-31)  pg


 


MCHC  32    (32-36)  %


 


Plt Count  152    (150-400)  K/uL


 


Neut % (Auto)  Not Reportable    


 


Lymph % (Auto)  Not Reportable    


 


Mono % (Auto)  Not Reportable    


 


Eos % (Auto)  Not Reportable    


 


Baso % (Auto)  Not Reportable    


 


Add Manual Diff  Yes    


 


Neutrophils % (Manual)  90 H    (36-66)  %


 


Band Neutrophils %  6    (5-11)  %


 


Lymphocytes % (Manual)  3 L    (24-44)  %


 


Monocytes % (Manual)  1 L    (2-6)  %


 


Anisocytosis  Marked H    


 


PT    11.4  (9.5-12.0)  sec


 


INR    1.04  (0.80-1.20)  


 


Sodium     (140-148)  mmol/L


 


Potassium     (3.6-5.2)  mmol/L


 


Chloride     (100-108)  mmol/L


 


Carbon Dioxide     (21-32)  mmol/L


 


Anion Gap     (5.0-14.0)  mmol/L


 


BUN     (7-18)  mg/dL


 


Creatinine     (0.8-1.3)  mg/dL


 


Est Cr Clr Drug Dosing     mL/min


 


Estimated GFR (MDRD)     (>60)  


 


Glucose     ()  mg/dL


 


Lactic Acid     (0.4-2.0)  mmol/L


 


Calcium     (8.5-10.1)  mg/dL


 


Magnesium     (1.8-2.4)  mg/dL


 


Total Bilirubin     (0.2-1.0)  mg/dL


 


AST     (15-37)  U/L


 


ALT     (12-78)  U/L


 


Alkaline Phosphatase     ()  U/L


 


C-Reactive Protein   6.59 H   (0.0-0.3)  mg/dL


 


Total Protein     (6.4-8.2)  g/dL


 


Albumin     (3.4-5.0)  g/dL


 


Globulin     (2.3-3.5)  g/dL


 


Albumin/Globulin Ratio     (1.2-2.2)  


 


Amylase     ()  U/L


 


Lipase     ()  U/L


 


Urine Color     


 


Urine Appearance     


 


Urine pH     (4.5-8.0)  


 


Ur Specific Gravity     (1.008-1.030)  


 


Urine Protein     (NEGATIVE)  mg/dL


 


Urine Glucose (UA)     (NEGATIVE)  mg/dL


 


Urine Ketones     (NEGATIVE)  mg/dL


 


Urine Occult Blood     (NEGATIVE)  


 


Urine Nitrite     (NEGAITVE)  


 


Urine Bilirubin     (NEGATIVE)  


 


Urine Urobilinogen     (NORMAL)  mg/dL


 


Ur Leukocyte Esterase     (NEGATIVE)  


 


Urine RBC     (0-5)  


 


Urine WBC     (0-5)  


 


Ur Epithelial Cells     


 


Amorphous Sediment     


 


Urine Bacteria     


 


Urine Mucus     














  04/04/19 04/04/19 04/04/19 Range/Units





  00:01 00:08 01:44 


 


WBC     (4.5-11.0)  K/uL


 


RBC     (4.30-5.90)  M/uL


 


Hgb     (12.0-15.0)  g/dL


 


Hct     (40.0-54.0)  %


 


MCV     (80-98)  fL


 


MCH     (27-31)  pg


 


MCHC     (32-36)  %


 


Plt Count     (150-400)  K/uL


 


Neut % (Auto)     


 


Lymph % (Auto)     


 


Mono % (Auto)     


 


Eos % (Auto)     


 


Baso % (Auto)     


 


Add Manual Diff     


 


Neutrophils % (Manual)     (36-66)  %


 


Band Neutrophils %     (5-11)  %


 


Lymphocytes % (Manual)     (24-44)  %


 


Monocytes % (Manual)     (2-6)  %


 


Anisocytosis     


 


PT     (9.5-12.0)  sec


 


INR     (0.80-1.20)  


 


Sodium  139 L    (140-148)  mmol/L


 


Potassium  3.7    (3.6-5.2)  mmol/L


 


Chloride  103    (100-108)  mmol/L


 


Carbon Dioxide  24    (21-32)  mmol/L


 


Anion Gap  15.7 H    (5.0-14.0)  mmol/L


 


BUN  9    (7-18)  mg/dL


 


Creatinine  0.7 L    (0.8-1.3)  mg/dL


 


Est Cr Clr Drug Dosing  119.06    mL/min


 


Estimated GFR (MDRD)  > 60    (>60)  


 


Glucose  107 H    ()  mg/dL


 


Lactic Acid    1.3  (0.4-2.0)  mmol/L


 


Calcium  8.5    (8.5-10.1)  mg/dL


 


Magnesium  1.5 L    (1.8-2.4)  mg/dL


 


Total Bilirubin  1.6 H D    (0.2-1.0)  mg/dL


 


AST  30    (15-37)  U/L


 


ALT  27    (12-78)  U/L


 


Alkaline Phosphatase  291 H D    ()  U/L


 


C-Reactive Protein     (0.0-0.3)  mg/dL


 


Total Protein  6.5    (6.4-8.2)  g/dL


 


Albumin  2.2 L    (3.4-5.0)  g/dL


 


Globulin  4.3 H    (2.3-3.5)  g/dL


 


Albumin/Globulin Ratio  0.5 L    (1.2-2.2)  


 


Amylase  14 L    ()  U/L


 


Lipase  82    ()  U/L


 


Urine Color   Yellow   


 


Urine Appearance   Clear   


 


Urine pH   8.0   (4.5-8.0)  


 


Ur Specific Gravity   1.010   (1.008-1.030)  


 


Urine Protein   Negative   (NEGATIVE)  mg/dL


 


Urine Glucose (UA)   Normal   (NEGATIVE)  mg/dL


 


Urine Ketones   Negative   (NEGATIVE)  mg/dL


 


Urine Occult Blood   Negative   (NEGATIVE)  


 


Urine Nitrite   Negative   (NEGAITVE)  


 


Urine Bilirubin   Negative   (NEGATIVE)  


 


Urine Urobilinogen   Normal   (NORMAL)  mg/dL


 


Ur Leukocyte Esterase   Negative   (NEGATIVE)  


 


Urine RBC   0-5   (0-5)  


 


Urine WBC   0-5   (0-5)  


 


Ur Epithelial Cells   Rare   


 


Amorphous Sediment   Not seen   


 


Urine Bacteria   Few   


 


Urine Mucus   Not seen   











[facility] Results Last 24 Hours: 


 Microbiology











 04/04/19 03:50 Gram Stain - Final





 Ascities Fluid 


 


 04/03/19 23:27 Influenza Type A Antigen Screen - Final





 Nasal, Unspecified    NEGATIVE INFLUENZA A VIRUS AG





 Influenza Type B Antigen Screen - Final





    NEGATIVE INFLUENZA B VIRUS AG











Med Orders - Current: 


 Current Medications





Acetaminophen (Tylenol)  650 mg PO Q4H PRN


   PRN Reason: Pain (Mild 1-3)/fever


   Last Admin: 04/04/19 08:39 Dose:  650 mg


Albuterol (Proventil Neb Soln)  2.5 mg NEB Q4H PRN


   PRN Reason: Shortness Of Breath/wheezing


Albuterol/Ipratropium (Duoneb 3.0-0.5 Mg/3 Ml)  3 ml NEB QID PRN


   PRN Reason: Shortness Of Breath/wheezing


Bisacodyl (Dulcolax)  5 mg PO DAILY PRN


   PRN Reason: Constipation


Lidocaine HCl 30 ml/ Al Hydroxide/Mg Hydroxide 30 ml/Diphenhydramine HCl 75 mg  

0 ml MUCMEM QID [facility]


Cyanocobalamin (Vitamin B12)  2,500 mcg PO DAILY [facility]


   Last Admin: 04/04/19 08:31 Dose:  2,500 mcg


Cyclobenzaprine HCl (Flexeril)  10 mg PO TID PRN


   PRN Reason: Muscle Spasm


Docusate Sodium (Colace)  100 mg PO BID PRN


   PRN Reason: Constipation


Duloxetine HCl (Cymbalta)  60 mg PO BID [facility]


   Last Admin: 04/04/19 08:31 Dose:  60 mg


Gabapentin (Neurontin)  600 mg PO TID [facility]


   Last Admin: 04/04/19 08:31 Dose:  600 mg


Sodium Chloride (Normal Saline)  1,000 mls @ 125 mls/hr IV ASDIRECTED [facility]


Vancomycin HCl 1.5 gm/ Sodium (Chloride)  250 mls @ 165 mls/hr IV Q12H [facility]


Piperacillin Sod/Tazobactam (Sod 3.375 gm/ Sodium Chloride)  50 mls @ 100 mls/

hr IV Q6H [facility]


Levothyroxine Sodium (Synthroid)  100 mcg PO ACBREAKFAST [facility]


   Last Admin: 04/04/19 08:30 Dose:  100 mcg


Levothyroxine Sodium (Levothyroxine)  25 mcg PO ACBREAKFAST [facility]


   Last Admin: 04/04/19 08:30 Dose:  25 mcg


Lorazepam (Ativan)  1 mg IV Q6H PRN


   PRN Reason: Nausea/Vomiting


Melatonin (Melatonin)  6 mg PO BEDTIME PRN


   PRN Reason: Insomnia


Morphine Sulfate (Morphine)  2 mg IVPUSH Q2H PRN


   PRN Reason: Pain (severe 7-10)


Morphine Sulfate (Ms Contin)  15 mg PO Q12H [facility]


Non-Formulary Medication (Amylase/Lipase/Protease [Creon Dr 6,000 Unit])  6,000 

unit PO TID [facility]


Ondansetron HCl (Zofran Odt)  4 mg PO Q6H PRN


   PRN Reason: Nausea able to take PO


Oxycodone HCl (Oxycodone)  10 mg PO Q4H PRN


   PRN Reason: Pain (moderate 4-6)


   Last Admin: 04/04/19 08:40 Dose:  10 mg


Pantoprazole Sodium (Protonix***)  40 mg PO BIDAC [facility]


Polyethylene Glycol (Miralax)  17 gm PO QPM [facility]


Ropinirole HCl (Requip)  1 mg PO BEDTIME [facility]


Sertraline HCl (Zoloft)  100 mg PO DAILY [facility]


   Last Admin: 04/04/19 08:31 Dose:  100 mg


Sodium Chloride (Saline Flush)  10 ml FLUSH DAILY [facility]


Tbo-Filgrastim (Granix)  480 mcg SUBCUT ONETIME ONE


   Stop: 04/04/19 10:31


Venlafaxine HCl (Effexor)  75 mg PO DAILY [facility]


   Last Admin: 04/04/19 08:31 Dose:  75 mg





Discontinued Medications





Acetaminophen (Tylenol Extra Strength)  1,000 mg PO ONETIME ONE


   Stop: 04/03/19 22:58


   Last Admin: 04/03/19 23:20 Dose:  1,000 mg


Lactated Ringer's (Ringers, Lactated)  1,000 mls @ 1,000 mls/hr IV BOLUS ONE


   Stop: 04/03/19 23:48


   Last Admin: 04/04/19 05:04 Dose:  Not Given


Vancomycin HCl 1.75 gm/ Sodium (Chloride)  250 mls @ 150 mls/hr IV NOW STA


   Stop: 04/04/19 02:41


   Last Admin: 04/04/19 01:19 Dose:  150 mls/hr


Sodium Chloride (Normal Saline)  1,000 mls @ 125 mls/hr IV ONETIME ONE


   Stop: 04/04/19 11:10


   Last Admin: 04/04/19 03:12 Dose:  125 mls/hr


Vancomycin HCl 1 gm/ Sodium (Chloride)  250 mls @ 150 mls/hr IV Q12H SHARAD


Morphine Sulfate (Ms Contin)  15 mg PO Q12H [facility]


   Last Admin: 04/04/19 05:44 Dose:  15 mg


Non-Formulary Medication (Levothyroxine [Levothyroxine])  125 mcg PO ACBRK [facility]


Ondansetron HCl (Zofran Odt)  4 mg PO ONETIME ONE


   Stop: 04/03/19 22:28


   Last Admin: 04/04/19 05:03 Dose:  Not Given


Oxycodone HCl (Oxycodone)  10 mg PO ONETIME ONE


   Stop: 04/03/19 22:58


   Last Admin: 04/03/19 23:21 Dose:  10 mg


Pantoprazole Sodium (Protonix Iv***)  40 mg IVPUSH Q12H [facility]


   Last Admin: 04/04/19 05:43 Dose:  40 mg











- Exam


Quality Assessment: DVT Prophylaxis


General: Alert, Oriented, Cooperative, Mild Distress


Lungs: Clear to Auscultation, Normal Respiratory Effort


Cardiovascular: Regular Rate, Regular Rhythm, No Murmurs


GI/Abdominal Exam: Soft, No Organomegaly, Tender.  No: Distended, Guarding, 

Rigid, Rebound


Extremities: Non-Tender, No Pedal Edema





- Problem List Review


Problem List Initiated/Reviewed/Updated: Yes





- My Orders


Last 24 Hours: 


My Active Orders





04/04/19 10:29


Abdomen Pelvis w Cont [CT] Stat 





04/04/19 10:30


Tbo-Filgrastim [Granix]   480 mcg SUBCUT ONETIME ONE 





04/04/19 10:45


Piperacillin/Tazobactam [Zosyn] 3.375 gm   Sodium Chloride 0.9% [Normal Saline] 

50 ml IV Q6H 














- Plan


Plan:: 





ASSESSMENT / PLAN





Fever/sepsis-probable abdominal source, related to percutaneous drainage tube 

and underlying pancreatic carcinoma with obstruction. Less likely central line 

infection


-CT scan of the abdomen and pelvis


-IV Fluids for rehydration NS at 125 mL per hour


-IV Antibiotic; Vancomycin 1 gram IV every 12 hours


-Add Zosyn 3.375 mg IV every 6 hours to cover abdominal ya                  

      


-blood cultures x2 pending, one from central line, addition bloody fluid 

culture from ascites fluid





Pancreatic cancer- this week finished 3rd week of chemotherapy, will now have 2 

weeks off, has follow up appointments next week, and repeat PET scan in a week. 

He has a right port-a-cath in right upper chest place 2.20.2019 at [facility] 

and Biliary drain in right upper abdomen  placed 2.2019 at [facility], has 

appoint next Wednesday 4/10/2019 to have larger drain place. 


-Culture from drain pending


-Add Zosyn as above


-CT scan as above





Tobacco use


-declines patch





Hx of Bariatric Surgery by Dr. Kp Herndon


-continue Vitamin B12 supplement 





Maintenance issues


-Orders home meds: on hold


-Nutrition:regular diet


-Edmondson catheter not indicated at this time


-DVT: SCD


-PPI; Lovenox 40 mg subcutaneous daily


-consult OT for discharge planning


-consult PT for strengthening.





CODE STATUS: DNR/DNI





Admission status: Admit to [address] [place] [address]


Admission justification.  This patient will be admitted for inpatient services 

and is medically appropriate meeting medical necessity for inpatient admission 

as outlined in my documentation.


I reasonably expect the patient will require inpatient services that span.  

Time over 2 midnights.  I reasonably expect this patient to be discharged or 

transferred within 96 hours after admission to the critical access hospital.





Disposition: home with Wife Lindsay





Primary care provider: Maksim Perez NP





Hospitalist: Dr. Chong

## 2019-04-04 NOTE — CRLCT
INDICATION



Abdominal pain. Fever. History of pancreatic cancer.



TECHNIQUE



CT scan of the abdomen and pelvis with 150 cc of [id number] given 

intravenously.



COMPARISON



CT scan of the abdomen and pelvis dated 2 November 2018. Chest x-ray dated 

1 June 2015. 



FINDINGS



The lung bases show trace dependent atelectasis. 1.0 cm pulmonary nodule in 

the left lower lobe best seen on image 3 of series 2 which was present on 

the 2015 chest x-ray.



No focal abnormalities identified in the visualized portions of the liver, 

spleen, and adrenal glands. 3.5 cm mass in the head of the pancreas is 

increased in size and on the previous study measured 2.2 cm. Dilation of 

the main pancreatic duct. Percutaneous biliary drain extending through the 

left lobe of the liver. Mild bile duct dilation. 



A few small cysts in the kidneys. The kidneys are otherwise unremarkable. 

No hydronephrosis. No obstructing uroliths. 



Postsurgical changes of the stomach. Anastomotic sutures in the mid small 

bowel. The remainder of the GI tract is incompletely distended but shows no 

gross abnormalities. 



No retroperitoneal, pelvic sidewall, or mesenteric adenopathy. 



Degenerative changes of the spine. Osteopenia. Stabilization hardware in 

the lower lumbar spine. 



Impression 



3.5 cm mass in the pancreatic head representing the patient`s pancreatic 

cancer is increased in size. 



Bile duct dilation and dilation of the main pancreatic duct. Percutaneous 

biliary drain in place. 



Dictated by: Nikolas Fallon MD @ 04/04/2019 12:01:11



(Electronically Signed)

## 2019-04-04 NOTE — PCM.HP
H&P History of Present Illness





- General


Date of Service: 04/03/19


Admit Problem/Dx: 


 Admission Diagnosis/Problem





Admission Diagnosis/Problem      Sepsis








Source of Information: Patient


History Limitations: Reports: No Limitations





- History of Present Illness


Initial Comments - Free Text/Narative: 





- History of Present Illness


INITIAL COMMENTS - FREE TEXT/NARRATIVE: 





67 yo male with pancreatic CA presents with a fever since midday yesterday. Was 

in to the clinic yesterday morning for a different issue and has not reported 

his current illness to his primary. Called the provider group who tx's his CA 

and was told to come right in to the ER and to not take any of his oral meds 

before arrival, he did not ask why he shouldn't take these meds. He denies any 

other sx's other than a mild sore throat currently. Did have some mild nausea 

earlier. 





Has a Powerport since 2/28/19 placed at [facility]. Is followed for his CA by 

[facility]. 





Onset of Symptoms: Reports: Today


Duration of Symptoms: Reports: Hour(s):


Location: Reports: Generalized (fever, not feeling well)


Severity: Moderate


Improves with: Reports: None


Worsens with: Reports: None


Context: Reports: Other (pancreatic cancer, chemo therapy this week. )


Associated Symptoms: Reports: Fever/Chills, Nausea/Vomiting





- Related Data


Allergies/Adverse Reactions: 


 Allergies











Allergy/AdvReac Type Severity Reaction Status Date / Time


 


Sulfa (Sulfonamide AdvReac  Hallucinati Verified 04/03/19 23:00





Antibiotics)   ons  


 


environmental Allergy  Hives Uncoded 04/03/19 23:00











Home Medications: 


 Home Meds





Ca Carbonate/Vitamin D3/Vit K [Calcium + D Soft Chewable Tab] 1 tab PO BID 01/20

/15 [History]


Cyanocobalamin (Vitamin B-12) [B-12] 2,500 mcg PO DAILY 01/20/15 [History]


Gabapentin [Neurontin] 600 mg PO TID 01/20/15 [History]


Levothyroxine 125 mcg PO ACBRK 01/20/15 [History]


Multivitamin with Minerals [Multivitamins with Minerals] 1 each PO BID 01/20/15 

[History]


Sertraline [Zoloft] 100 mg PO DAILY 01/20/15 [History]


Vitamin B Complex [B Complex] 1 tab PO DAILY 01/20/15 [History]


DULoxetine HCl [Cymbalta] 60 mg PO BID 03/31/16 [History]


Omeprazole 40 mg PO BID 03/31/16 [History]


Cyclobenzaprine [Flexeril] 10 mg PO TID PRN 12/08/16 [History]


Acetaminophen 650 mg PO Q4H PRN 06/21/18 [History]


Calcium Carbonate [Calcium] 500 mg PO DAILY PRN 06/21/18 [History]


EPINEPHrine [Epipen 2-Rich] 0.3 ml IM ASDIRECTED 06/21/18 [History]


rOPINIRole [Requip] 1 mg PO BEDTIME 06/21/18 [History]


Polyethylene Glycol 3350 17 g PO QPM 03/06/19 [History]


Prochlorperazine Maleate [Compazine] 10 mg PO Q6HR PRN 03/06/19 [History]


Venlafaxine [Effexor] 75 mg PO DAILY 03/06/19 [History]


oxyCODONE 10 mg PO Q4HR PRN 03/06/19 [History]


0.9 % Sodium Chloride [Sodium Chloride] 10 ml GTUBE DAILY 04/04/19 [History]


Amylase/Lipase/Protease [Creon DR 6,000 Unit] 6,000 unit PO TID 04/04/19 [

History]


Aspirin [Aspirin EC] 325 mg PO DAILY 04/04/19 [History]


Diphenhyd/Lidocaine/Nystatin [First-Bxn Mouthwash] 5 ml SSPIT QID 04/04/19 [

History]


Gemcitabine HCl 800 mg IV ASDIRECTED 04/04/19 [History]


Morphine [MS Contin] 15 mg PO Q12HR 04/04/19 [History]


PACLitaxel Protein-Bound [Abraxane] 298 mg IV ASDIRECTED 04/04/19 [History]


atorvaSTATin [Lipitor] 10 mg PO BEDTIME 04/04/19 [History]











Past Medical History


HEENT History: Reports: Impaired Vision, Other (See Below)


Other HEENT History: wears glasses, infection in right submandibular gland


Cardiovascular History: Reports: High Cholesterol


Respiratory History: Reports: Sleep Apnea


Gastrointestinal History: Reports: Bowel Obstruction, Cholelithiasis, Colon 

Polyp, Gastritis, GERD, Jaundice


Genitourinary History: Reports: Prostate Disorder


Musculoskeletal History: Reports: Arthritis, Back Pain, Chronic, Fracture, Neck 

Pain, Chronic, Other (See Below)


Other Musculoskeletal History: polyneuropathy.  R knee/ankle sprain


Neurological History: Reports: CVA


Psychiatric History: Reports: Anxiety


Endocrine/Metabolic History: Reports: Hypothyroidism, Obesity/BMI 30+


Hematologic History: Reports: B12 Deficiency


Oncologic (Cancer) History: Reports: Pancreatic





- Infectious Disease History


Infectious Disease History: Reports: Chicken Pox, MRSA


Other Infectious Disease History: MRSA 2012





- Past Surgical History


HEENT Surgical History: Reports: Oral Surgery


Respiratory Surgical History: Reports: None


GI Surgical History: Reports: Bariatric Procedure, Colonoscopy, EGD, Hernia 

Repair/Other


Other GI Surgeries/Procedures: colon resection; Panniculectomy


Male  Surgical History: Reports: TURP-Transurethral Resection of Prostate


Endocrine Surgical History: Reports: Other (See Below)


Other Endocrine Surgeries/Procedures: Salvitory gland


Neurological Surgical History: Reports: Spinal Fusion


Musculoskeletal Surgical History: Reports: Carpal Tunnel, Shoulder Surgery, 

Other (See Below)


Other Musculoskeletal Surgeries/Procedures:: left rotator cuff repair





Social & Family History





- Family History


Family Medical History: Noncontributory


HEENT: Reports: None


Cardiac: Reports: Heart Failure, Hypertension


Respiratory: Reports: Asthma, Other (See Below)


Other Respiratory Family Hisory: emphysema


Oncologic: Reports: Breast, Lung, Lymphoma, Prostate, Other (See Below)


Other Oncologic Family History: stomach





- Tobacco Use


Smoking Status *Q: Current Every Day Smoker


Years of Tobacco use: 50


Packs/Tins Daily: 3


Used Tobacco, but Quit: Yes


Month/Year Tobacco Last Used: 2011


Second Hand Smoke Exposure: No





- Caffeine Use


Caffeine Use: Reports: Coffee


Other Caffeine Use: 2 pots a days





- Recreational Drug Use


Recreational Drug Use: No





- Living Situation & Occupation


Living situation: Reports: 


Occupation: Retired (lives with his Wife of 33 years Lindsay [place], [place]. has 3 

children and 1 step-child all adults)





H&P Review of Systems





- Review of Systems:


Review Of Systems: See Below


General: Reports: Fever, Chills, Malaise, Fatigue, Decreased Appetite, Weight 

Loss


HEENT: Reports: Glasses, Other (dentures)


Pulmonary: Reports: No Symptoms


Gastrointestinal: Reports: Abdominal Pain (chronic, pancreatic cancer with Port-

a-cath in right upper chest shunt right upper quadrant.), Decreased Appetite, 

Nausea, Other (heartburn )


Genitourinary: Reports: No Symptoms


Musculoskeletal: Reports: Back Pain, Muscle Pain


Skin: Reports: Pallor


Psychiatric: Reports: No Symptoms


Neurological: Reports: No Symptoms


Hematologic/Lymphatic: Reports: Anemia


Immunologic: Reports: No Symptoms





Exam





- Exam


Exam: See Below





- Vital Signs


Vital Signs: 


 Last Vital Signs











Temp  37.3 C   04/04/19 01:26


 


Pulse  106 H  04/04/19 01:26


 


Resp  14   04/04/19 01:26


 


BP  105/60   04/04/19 01:26


 


Pulse Ox  95   04/04/19 01:26











Weight: 100.698 kg





- Exam


Quality Assessment: Central Line/PICC, DVT Prophylaxis


General: Alert, Oriented, Cooperative, Mild Distress


HEENT: PERRLA, Conjunctiva Clear, Hearing Intact, Mucosa Moist & Pink, Nares 

Patent, Glasses, Other (dentures)


Neck: Supple, Trachea Midline, Full Range of Motion


Lungs: Clear to Auscultation, Normal Respiratory Effort


Cardiovascular: Regular Rate, Regular Rhythm, Normal S1, Normal S2


GI/Abdominal Exam: Soft, Non-Tender, No Distention, Other (biliary drain noted 

to right upper abdomen, flows to leg bag. fluid clear dark brown color)


 (Male) Exam: Deferred


Rectal (Males) Exam: Deferred


Back Exam: Normal Inspection


Extremities: Normal Range of Motion, Non-Tender, Pedal Edema (1+)


Skin: Warm, Dry, Intact


Neurological: Reflexes Equal Bilateral, Strength Equal Bilateral, Normal Speech

, Normal Tone


Neuro Extensive - Mental Status: Alert, Oriented x3, Normal Mood/Affect


Psychiatric: Alert, Normal Affect, Normal Mood





- Patient Data


Lab Results Last 24 hrs: 


 Laboratory Results - last 24 hr











  04/03/19 04/03/19 04/04/19 Range/Units





  23:10 23:10 00:01 


 


WBC  19.5 H    (4.5-11.0)  K/uL


 


RBC  3.24 L    (4.30-5.90)  M/uL


 


Hgb  10.0 L D    (12.0-15.0)  g/dL


 


Hct  30.9 L    (40.0-54.0)  %


 


MCV  95    (80-98)  fL


 


MCH  31    (27-31)  pg


 


MCHC  32    (32-36)  %


 


Plt Count  152    (150-400)  K/uL


 


Neut % (Auto)  Not Reportable    


 


Lymph % (Auto)  Not Reportable    


 


Mono % (Auto)  Not Reportable    


 


Eos % (Auto)  Not Reportable    


 


Baso % (Auto)  Not Reportable    


 


Add Manual Diff  Yes    


 


Neutrophils % (Manual)  90 H    (36-66)  %


 


Band Neutrophils %  6    (5-11)  %


 


Lymphocytes % (Manual)  3 L    (24-44)  %


 


Monocytes % (Manual)  1 L    (2-6)  %


 


Anisocytosis  Marked H    


 


PT    11.4  (9.5-12.0)  sec


 


INR    1.04  (0.80-1.20)  


 


Sodium     (140-148)  mmol/L


 


Potassium     (3.6-5.2)  mmol/L


 


Chloride     (100-108)  mmol/L


 


Carbon Dioxide     (21-32)  mmol/L


 


Anion Gap     (5.0-14.0)  mmol/L


 


BUN     (7-18)  mg/dL


 


Creatinine     (0.8-1.3)  mg/dL


 


Est Cr Clr Drug Dosing     mL/min


 


Estimated GFR (MDRD)     (>60)  


 


Glucose     ()  mg/dL


 


Lactic Acid     (0.4-2.0)  mmol/L


 


Calcium     (8.5-10.1)  mg/dL


 


Magnesium     (1.8-2.4)  mg/dL


 


Total Bilirubin     (0.2-1.0)  mg/dL


 


AST     (15-37)  U/L


 


ALT     (12-78)  U/L


 


Alkaline Phosphatase     ()  U/L


 


C-Reactive Protein   6.59 H   (0.0-0.3)  mg/dL


 


Total Protein     (6.4-8.2)  g/dL


 


Albumin     (3.4-5.0)  g/dL


 


Globulin     (2.3-3.5)  g/dL


 


Albumin/Globulin Ratio     (1.2-2.2)  


 


Amylase     ()  U/L


 


Lipase     ()  U/L


 


Urine Color     


 


Urine Appearance     


 


Urine pH     (4.5-8.0)  


 


Ur Specific Gravity     (1.008-1.030)  


 


Urine Protein     (NEGATIVE)  mg/dL


 


Urine Glucose (UA)     (NEGATIVE)  mg/dL


 


Urine Ketones     (NEGATIVE)  mg/dL


 


Urine Occult Blood     (NEGATIVE)  


 


Urine Nitrite     (NEGAITVE)  


 


Urine Bilirubin     (NEGATIVE)  


 


Urine Urobilinogen     (NORMAL)  mg/dL


 


Ur Leukocyte Esterase     (NEGATIVE)  


 


Urine RBC     (0-5)  


 


Urine WBC     (0-5)  


 


Ur Epithelial Cells     


 


Amorphous Sediment     


 


Urine Bacteria     


 


Urine Mucus     














  04/04/19 04/04/19 04/04/19 Range/Units





  00:01 00:08 01:44 


 


WBC     (4.5-11.0)  K/uL


 


RBC     (4.30-5.90)  M/uL


 


Hgb     (12.0-15.0)  g/dL


 


Hct     (40.0-54.0)  %


 


MCV     (80-98)  fL


 


MCH     (27-31)  pg


 


MCHC     (32-36)  %


 


Plt Count     (150-400)  K/uL


 


Neut % (Auto)     


 


Lymph % (Auto)     


 


Mono % (Auto)     


 


Eos % (Auto)     


 


Baso % (Auto)     


 


Add Manual Diff     


 


Neutrophils % (Manual)     (36-66)  %


 


Band Neutrophils %     (5-11)  %


 


Lymphocytes % (Manual)     (24-44)  %


 


Monocytes % (Manual)     (2-6)  %


 


Anisocytosis     


 


PT     (9.5-12.0)  sec


 


INR     (0.80-1.20)  


 


Sodium  139 L    (140-148)  mmol/L


 


Potassium  3.7    (3.6-5.2)  mmol/L


 


Chloride  103    (100-108)  mmol/L


 


Carbon Dioxide  24    (21-32)  mmol/L


 


Anion Gap  15.7 H    (5.0-14.0)  mmol/L


 


BUN  9    (7-18)  mg/dL


 


Creatinine  0.7 L    (0.8-1.3)  mg/dL


 


Est Cr Clr Drug Dosing  119.06    mL/min


 


Estimated GFR (MDRD)  > 60    (>60)  


 


Glucose  107 H    ()  mg/dL


 


Lactic Acid    1.3  (0.4-2.0)  mmol/L


 


Calcium  8.5    (8.5-10.1)  mg/dL


 


Magnesium  1.5 L    (1.8-2.4)  mg/dL


 


Total Bilirubin  1.6 H D    (0.2-1.0)  mg/dL


 


AST  30    (15-37)  U/L


 


ALT  27    (12-78)  U/L


 


Alkaline Phosphatase  291 H D    ()  U/L


 


C-Reactive Protein     (0.0-0.3)  mg/dL


 


Total Protein  6.5    (6.4-8.2)  g/dL


 


Albumin  2.2 L    (3.4-5.0)  g/dL


 


Globulin  4.3 H    (2.3-3.5)  g/dL


 


Albumin/Globulin Ratio  0.5 L    (1.2-2.2)  


 


Amylase  14 L    ()  U/L


 


Lipase  82    ()  U/L


 


Urine Color   Yellow   


 


Urine Appearance   Clear   


 


Urine pH   8.0   (4.5-8.0)  


 


Ur Specific Gravity   1.010   (1.008-1.030)  


 


Urine Protein   Negative   (NEGATIVE)  mg/dL


 


Urine Glucose (UA)   Normal   (NEGATIVE)  mg/dL


 


Urine Ketones   Negative   (NEGATIVE)  mg/dL


 


Urine Occult Blood   Negative   (NEGATIVE)  


 


Urine Nitrite   Negative   (NEGAITVE)  


 


Urine Bilirubin   Negative   (NEGATIVE)  


 


Urine Urobilinogen   Normal   (NORMAL)  mg/dL


 


Ur Leukocyte Esterase   Negative   (NEGATIVE)  


 


Urine RBC   0-5   (0-5)  


 


Urine WBC   0-5   (0-5)  


 


Ur Epithelial Cells   Rare   


 


Amorphous Sediment   Not seen   


 


Urine Bacteria   Few   


 


Urine Mucus   Not seen   











Result Diagrams: 


 04/03/19 23:10





 04/04/19 00:01


[facility] Results Last 24 hrs: 


 Microbiology











 04/03/19 23:27 Influenza Type A Antigen Screen - Final





 Nasal, Unspecified    NEGATIVE INFLUENZA A VIRUS AG





 Influenza Type B Antigen Screen - Final





    NEGATIVE INFLUENZA B VIRUS AG














- Problem List


(1) Central line-associated bloodstream infection


SNOMED Code(s): 155573587


   ICD Code: T80.211A - BLOODSTREAM INFECTION DUE TO CENTRAL VENOUS CATHETER, 

INIT   Status: Acute   Priority: High   Current Visit: Yes   


Qualifiers: 


   Encounter type: initial encounter   Qualified Code(s): T80.211A - 

Bloodstream infection due to central venous catheter, initial encounter   





(2) Pancreatic cancer


SNOMED Code(s): 973901253


   ICD Code: C25.9 - MALIGNANT NEOPLASM OF PANCREAS, UNSPECIFIED   Status: 

Acute   Priority: High   Current Visit: Yes   


Qualifiers: 


   Pancreatic malignancy location: head of pancreas   Qualified Code(s): C25.0 

- Malignant neoplasm of head of pancreas   





(3) Tobacco dependence syndrome


SNOMED Code(s): 72260288


   ICD Code: F17.200 - NICOTINE DEPENDENCE, UNSPECIFIED, UNCOMPLICATED   Status

: Chronic   Priority: Low   Current Visit: Yes   





(4) History of bariatric surgery


SNOMED Code(s): 068162115, 465117117


   ICD Code: Z98.84 - BARIATRIC SURGERY STATUS   Status: Acute   Priority: Low 

  Current Visit: Yes   


Problem List Initiated/Reviewed/Updated: Yes


Orders Last 24hrs: 


 Active Orders 24 hr











 Category Date Time Status


 


 Patient Status Manage Transfer [TRANSFER] Routine ADT  04/04/19 02:59 Active


 


 CULTURE BLOOD [BC] Stat Lab  04/03/19 23:55 Received


 


 CULTURE BLOOD [BC] Stat Lab  04/04/19 00:44 Ordered


 


 Sodium Chloride 0.9% [Normal Saline] 1,000 ml Med  04/04/19 03:11 Active





 IV ONETIME   


 


 Resuscitation Status Routine Resus Stat  04/04/19 03:02 Ordered








 Medication Orders





Sodium Chloride (Normal Saline)  1,000 mls @ 125 mls/hr IV ONETIME ONE


   Stop: 04/04/19 11:10


   Last Admin: 04/04/19 03:12  Dose: 125 mls/hr








Assessment/Plan Comment:: 





ASSESSMENT / PLAN


-





67 yo male with pancreatic CA presents with a fever since midday yesterday. Was 

in to the clinic yesterday morning for a different issue and has not reported 

his current illness to his primary. Called the provider group who tx's his CA 

and was told to come right in to the ER and to not take any of his oral meds 

before arrival, he did not ask why he shouldn't take these meds. He denies any 

other sx's other than a mild sore throat currently. Did have some mild nausea 

earlier. 





Has a Powerport since 2/28/19 placed at [facility]. Is followed for his CA by 

[facility]. 





Labs in ER show a WBC 19.5, last WBC on 4/1/2019 5.9 Hgb 11.5, now 10.0, IV 

Vancomycin 1 gram and IV fluids started in the ER. will admit to [address] [place] [address] for 

further care.  and Mrs. Wong agree with plan of care.





Plan


Sepsis, Central line associated bloodstream infections


-Admit to [address] [place] [address] for further monitoring


-IV Fluids for rehydration NS at 125 mL per hour


-IV Antibiotic; Vancomycin 1 gram IV every 12 hours                        


-Advise to notify nurses of any chest pain or other symptoms


-blood cultures x2 pending, one from central line, addition bloody fluid 

culture from ascites fluid





Pancreatic cancer, this week finished 3rd week of chemotherapy, will now have 2 

weeks off, has follow up appointments next week, and repeat PET scan in a week. 

He has a right port-a-cath in right upper chest place 2.20.2019 at [facility] 

and Biliary drain in right upper abdomen  placed 2.2019 at [facility], has 

appoint next Wednesday 4/10/2019 to have larger drain place. 





Tobacco use


-declines patch





Hx of Bariatric Surgery by Dr. Kp Herndon


-continue Vitamin B12 supplement 





Maintenance issues


-Orders home meds: on hold


-Nutrition:regular diet


-Edmondson catheter not indicated at this time


-DVT: SCD


-PPI; IV Protonix 40mg bid


-consult OT for discharge planning


-consult PT for strengthening.





CODE STATUS: DNR/DNI





Admission status: Admit to [address] [place] [address]


Admission justification.  This patient will be admitted for inpatient services 

and is medically appropriate meeting medical necessity for inpatient admission 

as outlined in my documentation.


I reasonably expect the patient will require inpatient services that span.  

Time over 2 midnights.  I reasonably expect this patient to be discharged or 

transferred within 96 hours after admission to the critical access hospital.





Disposition: home with Wife Lindsay





Primary care provider: Maksim Perez NP





Hospitalist: Dr. Chong

## 2019-04-04 NOTE — CRLCR
INDICATION:



Fever



TECHNIQUE:



Chest 2 views.



COMPARISON:



06/01/2018 



FINDINGS:



Cardiovascular and mediastinum:  Heart size and vasculature are normal in 

caliber and appearance.  Mediastinum is within normal limits.  



Lungs and pleural spaces: Right-sided port-a-catheter tip terminates in the 

proximal SVC. Lungs are clear.  No sign of infiltrate. Stable 10 millimeter 

nodule left lower lobe. No sign of pleural effusion.  No pneumothorax.  



Bones and soft tissues:  No significant findings.  



IMPRESSION:



No evidence for pneumonia.



10 millimeter nodule left lower lobe.



Dictated by Kailash Rhodes MD @ 4/4/2019 12:45:23 AM



Dictated by: Kailash Rhodes MD @ 04/04/2019 00:45:33



(Electronically Signed)

## 2019-04-05 RX ADMIN — TAZOBACTAM SODIUM AND PIPERACILLIN SODIUM SCH MLS/HR: 375; 3 INJECTION, SOLUTION INTRAVENOUS at 00:00

## 2019-04-05 RX ADMIN — MAGNESIUM SULFATE IN WATER SCH MLS/HR: 40 INJECTION, SOLUTION INTRAVENOUS at 15:55

## 2019-04-05 RX ADMIN — CYANOCOBALAMIN TAB 1000 MCG SCH MCG: 1000 TAB at 08:02

## 2019-04-05 RX ADMIN — MORPHINE SULFATE SCH MG: 15 TABLET, EXTENDED RELEASE ORAL at 18:05

## 2019-04-05 RX ADMIN — MORPHINE SULFATE SCH MG: 15 TABLET, EXTENDED RELEASE ORAL at 06:19

## 2019-04-05 RX ADMIN — MAGNESIUM SULFATE IN WATER SCH MLS/HR: 40 INJECTION, SOLUTION INTRAVENOUS at 20:46

## 2019-04-05 RX ADMIN — PANCRELIPASE SCH CAP: 60000; 12000; 38000 CAPSULE, DELAYED RELEASE PELLETS ORAL at 15:54

## 2019-04-05 RX ADMIN — PANCRELIPASE SCH CAP: 60000; 12000; 38000 CAPSULE, DELAYED RELEASE PELLETS ORAL at 07:41

## 2019-04-05 RX ADMIN — Medication SCH: at 08:03

## 2019-04-05 RX ADMIN — TAZOBACTAM SODIUM AND PIPERACILLIN SODIUM SCH MLS/HR: 375; 3 INJECTION, SOLUTION INTRAVENOUS at 23:00

## 2019-04-05 RX ADMIN — Medication SCH CAP: at 20:46

## 2019-04-05 RX ADMIN — ALUMINUM HYDROXIDE, MAGNESIUM HYDROXIDE, AND SIMETHICONE SCH ML: 200; 200; 20 SUSPENSION ORAL at 15:56

## 2019-04-05 RX ADMIN — TAZOBACTAM SODIUM AND PIPERACILLIN SODIUM SCH MLS/HR: 375; 3 INJECTION, SOLUTION INTRAVENOUS at 18:04

## 2019-04-05 RX ADMIN — ALUMINUM HYDROXIDE, MAGNESIUM HYDROXIDE, AND SIMETHICONE SCH ML: 200; 200; 20 SUSPENSION ORAL at 06:20

## 2019-04-05 RX ADMIN — Medication SCH CAP: at 08:01

## 2019-04-05 RX ADMIN — TAZOBACTAM SODIUM AND PIPERACILLIN SODIUM SCH MLS/HR: 375; 3 INJECTION, SOLUTION INTRAVENOUS at 11:39

## 2019-04-05 RX ADMIN — ALUMINUM HYDROXIDE, MAGNESIUM HYDROXIDE, AND SIMETHICONE SCH ML: 200; 200; 20 SUSPENSION ORAL at 22:59

## 2019-04-05 RX ADMIN — ALUMINUM HYDROXIDE, MAGNESIUM HYDROXIDE, AND SIMETHICONE SCH ML: 200; 200; 20 SUSPENSION ORAL at 10:28

## 2019-04-05 RX ADMIN — PANCRELIPASE SCH CAP: 60000; 12000; 38000 CAPSULE, DELAYED RELEASE PELLETS ORAL at 11:39

## 2019-04-05 RX ADMIN — TAZOBACTAM SODIUM AND PIPERACILLIN SODIUM SCH MLS/HR: 375; 3 INJECTION, SOLUTION INTRAVENOUS at 06:21

## 2019-04-05 NOTE — PCM.PN
- General Info


Date of Service: 04/05/19


Subjective Update: 





Mr. Lawrence has remained stable since yesterday, vital signs have been good and he 

has remained afebrile. Nominal pain is back to baseline and he denies 

significant nausea or vomiting. White blood cell count remains elevated but has 

improved significantly since yesterday. Cultures remain negative.


Functional Status: Reports: Pain Controlled, Tolerating Diet, Ambulating, 

Urinating





- Review of Systems


General: Reports: Weakness.  Denies: Fever, Chills


Pulmonary: Reports: No Symptoms


Cardiovascular: Reports: No Symptoms


Gastrointestinal: Reports: No Symptoms





- Patient Data


Vitals - Most Recent: 


 Last Vital Signs











Temp  97.7 F   04/05/19 11:40


 


Pulse  93   04/05/19 11:40


 


Resp  16   04/05/19 11:40


 


BP  96/52 L  04/05/19 11:40


 


Pulse Ox  98   04/05/19 11:40











Weight - Most Recent: 220 lb 5.985 oz


I&O - Last 24 Hours: 


 Intake & Output











 04/04/19 04/05/19 04/05/19





 22:59 06:59 14:59


 


Intake Total 1506 1247 940


 


Output Total 925  750


 


Balance 581 1247 190











Lab Results Last 24 Hours: 


 Laboratory Results - last 24 hr











  04/05/19 04/05/19 Range/Units





  09:11 09:11 


 


WBC  12.3 H   (4.5-11.0)  K/uL


 


RBC  2.63 L   (4.30-5.90)  M/uL


 


Hgb  8.5 L   (12.0-15.0)  g/dL


 


Hct  25.6 L   (40.0-54.0)  %


 


MCV  97   (80-98)  fL


 


MCH  32 H   (27-31)  pg


 


MCHC  33   (32-36)  %


 


Plt Count  196   (150-400)  K/uL


 


Add Manual Diff  Yes   


 


Neutrophils % (Manual)  81 H   (36-66)  %


 


Band Neutrophils %  3 L   (5-11)  %


 


Lymphocytes % (Manual)  11 L   (24-44)  %


 


Monocytes % (Manual)  2   (2-6)  %


 


Eosinophils % (Manual)  3   (2-4)  %


 


Sodium   141  (140-148)  mmol/L


 


Potassium   3.4 L  (3.6-5.2)  mmol/L


 


Chloride   109 H  (100-108)  mmol/L


 


Carbon Dioxide   25  (21-32)  mmol/L


 


Anion Gap   10.4  (5.0-14.0)  mmol/L


 


BUN   12  (7-18)  mg/dL


 


Creatinine   0.6 L  (0.8-1.3)  mg/dL


 


Est Cr Clr Drug Dosing   138.96  mL/min


 


Estimated GFR (MDRD)   > 60  (>60)  


 


Glucose   78  ()  mg/dL


 


Calcium   8.1 L  (8.5-10.1)  mg/dL


 


Magnesium   1.6 L  (1.8-2.4)  mg/dL


 


Total Bilirubin   0.9  (0.2-1.0)  mg/dL


 


AST   23  (15-37)  U/L


 


ALT   21  (12-78)  U/L


 


Alkaline Phosphatase   234 H  ()  U/L


 


Total Protein   5.6 L  (6.4-8.2)  g/dL


 


Albumin   1.8 L  (3.4-5.0)  g/dL


 


Globulin   3.8 H  (2.3-3.5)  g/dL


 


Albumin/Globulin Ratio   0.5 L  (1.2-2.2)  











[place] Results Last 24 Hours: 


 Microbiology











 04/04/19 03:50 Gram Stain - Final





 Ascities Fluid Body Fluid Culture - Preliminary


 


 04/03/19 00:05 Aerobic Blood Culture - Preliminary





 Blood - Arm, Left    NO GROWTH AFTER 1 DAY





 Anaerobic Blood Culture - Preliminary





    NO GROWTH AFTER 1 DAY


 


 04/03/19 23:55 Aerobic Blood Culture - Preliminary





 Blood - Port-A-Cath    NO GROWTH AFTER 1 DAY





 Anaerobic Blood Culture - Preliminary





    NO GROWTH AFTER 1 DAY











Med Orders - Current: 


 Current Medications





Acetaminophen (Tylenol)  650 mg PO Q4H PRN


   PRN Reason: Pain (Mild 1-3)/fever


   Last Admin: 04/05/19 04:05 Dose:  650 mg


Albuterol (Proventil Neb Soln)  2.5 mg NEB Q4H PRN


   PRN Reason: Shortness Of Breath/wheezing


Albuterol/Ipratropium (Duoneb 3.0-0.5 Mg/3 Ml)  3 ml NEB QID PRN


   PRN Reason: Shortness Of Breath/wheezing


Lipase/Protease/Amylase (Julienne Peterson 12,000 Units)  1 cap PO TIDAC SHARAD


   Last Admin: 04/05/19 11:39 Dose:  1 cap


Bisacodyl (Dulcolax)  5 mg PO DAILY PRN


   PRN Reason: Constipation


Lidocaine HCl 30 ml/ Al Hydroxide/Mg Hydroxide 30 ml/Diphenhydramine HCl 75 mg  

0 ml MUCMEM QID [facility]


   Last Admin: 04/05/19 10:28 Dose:  5 ml


Cyanocobalamin (Vitamin B12)  2,500 mcg PO DAILY [facility]


   Last Admin: 04/05/19 08:02 Dose:  2,500 mcg


Cyclobenzaprine HCl (Flexeril)  10 mg PO TID PRN


   PRN Reason: Muscle Spasm


Docusate Sodium (Colace)  100 mg PO BID PRN


   PRN Reason: Constipation


Duloxetine HCl (Cymbalta)  60 mg PO BID [facility]


   Last Admin: 04/05/19 08:01 Dose:  60 mg


Enoxaparin Sodium (Lovenox)  40 mg SUBCUT DAILY [facility]


   Last Admin: 04/05/19 08:02 Dose:  40 mg


Gabapentin (Neurontin)  600 mg PO TID [facility]


   Last Admin: 04/05/19 14:19 Dose:  600 mg


Vancomycin HCl 1.5 gm/ Sodium (Chloride)  250 mls @ 165 mls/hr IV Q12H [facility]


   Last Admin: 04/05/19 12:09 Dose:  165 mls/hr


Piperacillin/Tazobactam/ (Dextrose 3.375 gm/ Premix)  50 mls @ 100 mls/hr IV 

Q6H [facility]


   Last Admin: 04/05/19 11:39 Dose:  100 mls/hr


Magnesium Sulfate 2 gm/ Premix  50 mls @ 25 mls/hr IV Q6H [facility]


   Stop: 04/05/19 22:29


Lactobacillus Rhamnosus (Culturelle)  1 cap PO BID [facility]


   Last Admin: 04/05/19 08:01 Dose:  1 cap


Levothyroxine Sodium (Synthroid)  100 mcg PO ACBREAKFAST [facility]


   Last Admin: 04/05/19 07:42 Dose:  100 mcg


Levothyroxine Sodium (Levothyroxine)  25 mcg PO ACBREAKFAST [facility]


   Last Admin: 04/05/19 07:41 Dose:  25 mcg


Loratadine (Claritin Reditabs)  10 mg PO DAILY PRN


   PRN Reason: Other


   Last Admin: 04/04/19 18:18 Dose:  10 mg


Lorazepam (Ativan)  1 mg IV Q6H PRN


   PRN Reason: Nausea/Vomiting


Magnesium Oxide (Magnesium Oxide)  400 mg PO BID [facility]


Melatonin (Melatonin)  6 mg PO BEDTIME PRN


   PRN Reason: Insomnia


Morphine Sulfate (Morphine)  2 mg IVPUSH Q2H PRN


   PRN Reason: Pain (severe 7-10)


   Last Admin: 04/05/19 07:43 Dose:  2 mg


Morphine Sulfate (Ms Contin)  15 mg PO Q12H [facility]


   Last Admin: 04/05/19 06:19 Dose:  15 mg


Ondansetron HCl (Zofran Odt)  4 mg PO Q6H PRN


   PRN Reason: Nausea able to take PO


Oxycodone HCl (Oxycodone)  10 mg PO Q4H PRN


   PRN Reason: Pain (moderate 4-6)


   Last Admin: 04/05/19 12:09 Dose:  10 mg


Pantoprazole Sodium (Protonix***)  40 mg PO BIDAC [facility]


   Last Admin: 04/05/19 07:41 Dose:  40 mg


Polyethylene Glycol (Miralax)  17 gm PO QPM [facility]


   Last Admin: 04/04/19 17:47 Dose:  17 gm


Ropinirole HCl (Requip)  1 mg PO BEDTIME [facility]


   Last Admin: 04/04/19 20:03 Dose:  1 mg


Sodium Chloride (Saline Flush)  10 ml FLUSH DAILY [facility]


   Last Admin: 04/05/19 08:03 Dose:  Not Given


Venlafaxine HCl (Effexor)  75 mg PO BID [facility]


   Last Admin: 04/05/19 08:01 Dose:  75 mg





Discontinued Medications





Acetaminophen (Tylenol Extra Strength)  1,000 mg PO ONETIME ONE


   Stop: 04/03/19 22:58


   Last Admin: 04/03/19 23:20 Dose:  1,000 mg


Lactated Ringer's (Ringers, Lactated)  1,000 mls @ 1,000 mls/hr IV BOLUS ONE


   Stop: 04/03/19 23:48


   Last Admin: 04/04/19 05:04 Dose:  Not Given


Vancomycin HCl 1.75 gm/ Sodium (Chloride)  250 mls @ 150 mls/hr IV NOW STA


   Stop: 04/04/19 02:41


   Last Admin: 04/04/19 01:19 Dose:  150 mls/hr


Sodium Chloride (Normal Saline)  1,000 mls @ 125 mls/hr IV ONETIME ONE


   Stop: 04/04/19 11:10


   Last Admin: 04/04/19 03:12 Dose:  125 mls/hr


Sodium Chloride (Normal Saline)  1,000 mls @ 125 mls/hr IV ASDIRECTED [facility]


   Last Admin: 04/05/19 08:50 Dose:  125 mls/hr


Sodium Chloride (Normal Saline)  86 mls @ 3.5 mls/sec IV ONETIME ONE


   Stop: 04/04/19 10:37


   Last Admin: 04/04/19 11:17 Dose:  3.5 mls/sec


Iopamidol (Isovue-300 (61%))  150 ml IV .AS DIRECTED [facility]


   Stop: 04/04/19 12:00


   Last Admin: 04/04/19 11:17 Dose:  150 ml


Morphine Sulfate (Ms Contin)  15 mg PO Q12H [facility]


   Last Admin: 04/04/19 05:44 Dose:  15 mg


Non-Formulary Medication (Levothyroxine [Levothyroxine])  125 mcg PO ACBRK [facility]


Ondansetron HCl (Zofran Odt)  4 mg PO ONETIME ONE


   Stop: 04/03/19 22:28


   Last Admin: 04/04/19 05:03 Dose:  Not Given


Oxycodone HCl (Oxycodone)  10 mg PO ONETIME ONE


   Stop: 04/03/19 22:58


   Last Admin: 04/03/19 23:21 Dose:  10 mg


Pantoprazole Sodium (Protonix Iv***)  40 mg IVPUSH Q12H [facility]


   Last Admin: 04/04/19 05:43 Dose:  40 mg


Polyethylene Glycol (Miralax)  17 gm PO QPM [facility]


Potassium Chloride (Klor-Con M20)  40 meq PO ONETIME ONE


   Stop: 04/05/19 10:01


   Last Admin: 04/05/19 10:28 Dose:  40 meq


Sertraline HCl (Zoloft)  100 mg PO DAILY [facility]


   Last Admin: 04/04/19 08:31 Dose:  100 mg


Sodium Chloride (Saline Flush)  10 ml FLUSH ONETIME ONE


   Stop: 04/04/19 10:37


   Last Admin: 04/04/19 11:17 Dose:  10 ml


Tbo-Filgrastim (Granix)  480 mcg SUBCUT ONETIME ONE


   Stop: 04/04/19 11:31


   Last Admin: 04/04/19 11:55 Dose:  480 mcg


Venlafaxine HCl (Effexor)  75 mg PO DAILY [facility]


   Last Admin: 04/04/19 08:31 Dose:  75 mg











- Exam


General: Alert, Oriented, No Acute Distress


Lungs: Clear to Auscultation, Normal Respiratory Effort


Cardiovascular: Regular Rate, Regular Rhythm, No Murmurs


GI/Abdominal Exam: Soft, Non-Tender, No Organomegaly, No Distention


Extremities: Non-Tender, No Pedal Edema





- Problem List Review


Problem List Initiated/Reviewed/Updated: Yes





- My Orders


Last 24 Hours: 


My Active Orders





04/04/19 13:44


Dietary Supplements [RC] BIDAC 





04/04/19 17:24


Loratadine [Claritin RediTabs]   10 mg PO DAILY PRN 





04/05/19 14:18


Convert IV to Saline Lock [OM.PC] Routine 





04/05/19 14:30


Magnesium Oxide   400 mg PO BID 


Magnesium Sulfate/Water [Magnesium Sulfate 2 GM in Water 50 ML] 2 gm   Premix 

Bag 1 bag IV Q6H 





04/06/19 05:00


BASIC METABOLIC PANEL,BMP [CHEM] Timed 


CBC WITH AUTO DIFF [HEME] Timed 


MAGNESIUM [CHEM] Timed 














- Plan


Plan:: 





ASSESSMENT / PLAN





Fever/sepsis-probable abdominal source, related to percutaneous drainage tube 

and underlying pancreatic carcinoma with obstruction. Less likely central line 

infection. Ulcers remain negative, abdominal pain back to baseline with no 

nausea or vomiting. White blood cell count significantly improved but remains 

modestly elevated


-Saline lock IV


-Continue IV vancomycin and Zosyn and additional 24 hours                     


-blood cultures x2 pending, one from central line, culture from biliary 

drainage tube pending





Pancreatic cancer- this week finished 3rd week of chemotherapy, will now have 2 

weeks off, has follow up appointments next week, and repeat PET scan in a week. 

He has a right port-a-cath in right upper chest place 2.20.2019 at [facility] 

and Biliary drain in right upper abdomen  placed 2.2019 at [facility], has 

appoint next Wednesday 4/10/2019 to have larger drain place. 


-Culture from drain pending





Tobacco use


-declines patch





Hx of Bariatric Surgery by Dr. Kp Herndon


-continue Vitamin B12 supplement 





Maintenance issues


-Orders home meds: on hold


-Nutrition:regular diet


-Edmondson catheter not indicated at this time


-DVT: SCD


-PPI; Lovenox 40 mg subcutaneous daily


-consult OT for discharge planning


-consult PT for strengthening.





CODE STATUS: DNR/DNI





Admission status: Admit to [address] [place] [address]


Admission justification.  This patient will be admitted for inpatient services 

and is medically appropriate meeting medical necessity for inpatient admission 

as outlined in my documentation.


I reasonably expect the patient will require inpatient services that span.  

Time over 2 midnights.  I reasonably expect this patient to be discharged or 

transferred within 96 hours after admission to the critical [facility] hospital.





Disposition: home with Wife Lindsay





Primary care provider: Maksim Perez NP





Hospitalist: Dr. Chong

## 2019-04-06 VITALS — DIASTOLIC BLOOD PRESSURE: 57 MMHG | SYSTOLIC BLOOD PRESSURE: 97 MMHG

## 2019-04-06 RX ADMIN — TAZOBACTAM SODIUM AND PIPERACILLIN SODIUM SCH MLS/HR: 375; 3 INJECTION, SOLUTION INTRAVENOUS at 05:33

## 2019-04-06 RX ADMIN — Medication SCH: at 08:06

## 2019-04-06 RX ADMIN — Medication SCH CAP: at 07:36

## 2019-04-06 RX ADMIN — Medication SCH: at 08:07

## 2019-04-06 RX ADMIN — PANCRELIPASE SCH CAP: 60000; 12000; 38000 CAPSULE, DELAYED RELEASE PELLETS ORAL at 11:28

## 2019-04-06 RX ADMIN — ALUMINUM HYDROXIDE, MAGNESIUM HYDROXIDE, AND SIMETHICONE SCH ML: 200; 200; 20 SUSPENSION ORAL at 10:17

## 2019-04-06 RX ADMIN — CYANOCOBALAMIN TAB 1000 MCG SCH: 1000 TAB at 08:12

## 2019-04-06 RX ADMIN — CYANOCOBALAMIN TAB 1000 MCG SCH MCG: 1000 TAB at 07:38

## 2019-04-06 RX ADMIN — ALUMINUM HYDROXIDE, MAGNESIUM HYDROXIDE, AND SIMETHICONE SCH ML: 200; 200; 20 SUSPENSION ORAL at 05:34

## 2019-04-06 RX ADMIN — MORPHINE SULFATE SCH MG: 15 TABLET, EXTENDED RELEASE ORAL at 05:33

## 2019-04-06 RX ADMIN — TAZOBACTAM SODIUM AND PIPERACILLIN SODIUM SCH: 375; 3 INJECTION, SOLUTION INTRAVENOUS at 12:14

## 2019-04-06 RX ADMIN — PANCRELIPASE SCH CAP: 60000; 12000; 38000 CAPSULE, DELAYED RELEASE PELLETS ORAL at 07:33

## 2019-04-06 NOTE — [id number]
**Discharge Summary





- Hospital Course


Brief History: Mr. Lawrence is a 68-year-old gentleman with known pancreatic 

carcinoma. He is status post placement of a percutaneous biliary drainage tube 

because of obstruction. He was admitted through the emergency department with 

fever, nausea, vomiting, and increased abdominal pain, secondary to cholangitis 

and infection of the drainage tube.





- Discharge Data


Discharge Date: 04/06/19


Discharge Disposition: Home, Self-Care 01


Condition: Fair





- Discharge Diagnosis/Problem(s)


(1) Cholangitis


SNOMED Code(s): 34970008


   ICD Code: K83.09 - OTHER CHOLANGITIS   Status: Acute   Current Visit: Yes   





(2) Pancreatic cancer


SNOMED Code(s): 255757167


   ICD Code: C25.9 - MALIGNANT NEOPLASM OF PANCREAS, UNSPECIFIED   Status: 

Acute   Priority: High   Current Visit: Yes   


Qualifiers: 


   Pancreatic malignancy location: head of pancreas   Qualified Code(s): C25.0 

- Malignant neoplasm of head of pancreas   





- Patient Summary/Data


Hospital Course: 





Mr. Lawrence is a 69 yo male with pancreatic CA presents with a fever of 24 hours 

duration. During that period of time also developed nausea, vomiting, and 

increase in abdominal pain. Was in to the clinic yesterday morning for a 

different issue and has not reported his current illness to his primary. Called 

the provider group who tx's his CA and was told to come right in to the ER and 

to not take any of his oral meds before arrival, he did not ask why he shouldn'

t take these meds. Evaluation in the emergency department documented elevation 

in white blood cell count at 19,000. Chest x-ray was clear and urinalysis 

showed no evidence of underlying infection.





On admission he was given IV fluids for hydration. Initial clinical impression 

was probable infection of his Vazquez catheter and he was started on IV 

antibiotic therapy with vancomycin. After review the following morning it was 

felt to be more likely that the infection was related to his percutaneous 

biliary drainage tube. IV Zosyn was added to his antibiotic regimen. Blood 

cultures obtained on admission remained negative throughout his hospital stay. 

Culture of drainage from the percutaneous biliary tube did grow out Klebsiella 

pneumonia, final sensitivities are pending at the time of discharge. CT scan of 

the abdomen and pelvis was obtained, this did document his previously known 

pancreatic carcinoma and did show the percutaneous biliary drainage tube. 

Images were sent to the patient's interventional radiologist, Dr. Philip. He 

felt that there was a small fluid collection present, one 0.01.5 cm in size. 

He was not sure if this was a collection of bile versus possible infection. He 

did not feel like there was an indication for transfer of the patient or 

immediate intervention. Over the course of the next few days of hospitalization 

with IV antibiotics Mr. Lawrence significantly improved. For 2 days prior to 

discharge there was no significant temperature elevation and his white blood 

cell count normalized. He will be discharged home on oral antibiotic therapy 

with Augmentin, twice daily until follow-up appointment with interventional 

radiology. Activity will be as tolerated and he will remain on his usual diet. 

In addition to the antibiotic he will be on a probiotic therapy twice daily. 

Follow-up appointment is already been scheduled for him with the oncology group 

for Tuesday, April 8. Appointment with interventional radiology is already set 

up for Wednesday, April 9. He will return immediately to the emergency 

department if he notes recurrent fever or other symptoms.





- Patient Instructions


Diet: Usual Diet as Tolerated


Activity: As Tolerated


Other/Special Instructions: Patient already has appointment scheduled with 

oncology for Tuesday, April 9, and interventional radiology for Wednesday, 

April 10





- Discharge Plan


*PRESCRIPTION DRUG MONITORING PROGRAM REVIEWED*: No


*COPY OF PRESCRIPTION DRUG MONITORING REPORT IN PATIENT MIGDALIA: No


Prescriptions/Med Rec: 


Amoxicillin/Potassium Clav [Augmentin 875-125 Tablet] 1 each PO BID #8 tablet


Lactobacillus Rhamnosus GG [Culturelle] 1 cap PO BID #60 cap


Home Medications: 


 Home Meds





Ca Carbonate/Vitamin D3/Vit K [Calcium + D Soft Chewable Tab] 1 tab PO BID 01/20

/15 [History]


Cyanocobalamin (Vitamin B-12) [B-12] 2,500 mcg PO DAILY 01/20/15 [History]


Gabapentin [Neurontin] 600 mg PO TID 01/20/15 [History]


Levothyroxine 125 mcg PO ACBRK 01/20/15 [History]


Multivitamin with Minerals [Multivitamins with Minerals] 1 each PO BID 01/20/15 

[History]


Sertraline [Zoloft] 100 mg PO DAILY 01/20/15 [History]


Vitamin B Complex [B Complex] 1 tab PO DAILY 01/20/15 [History]


DULoxetine HCl [Cymbalta] 60 mg PO BID 03/31/16 [History]


Omeprazole 40 mg PO BID 03/31/16 [History]


Cyclobenzaprine [Flexeril] 10 mg PO TID PRN 12/08/16 [History]


Acetaminophen 650 mg PO Q4H PRN 06/21/18 [History]


Calcium Carbonate [Calcium] 500 mg PO DAILY PRN 06/21/18 [History]


EPINEPHrine [Epipen 2-Rich] 0.3 ml IM ASDIRECTED 06/21/18 [History]


rOPINIRole [Requip] 1 mg PO BEDTIME 06/21/18 [History]


Polyethylene Glycol 3350 17 g PO QPM 03/06/19 [History]


Prochlorperazine Maleate [Compazine] 10 mg PO Q6HR PRN 03/06/19 [History]


Venlafaxine [Effexor] 75 mg PO BID 03/06/19 [History]


oxyCODONE 10 mg PO Q4HR PRN 03/06/19 [History]


0.9 % Sodium Chloride [Sodium Chloride] 10 ml GTUBE DAILY 04/04/19 [History]


Amylase/Lipase/Protease [Creon DR 6,000 Unit] 12,000 unit PO TID 04/04/19 [

History]


Aspirin [Aspirin EC] 325 mg PO DAILY 04/04/19 [History]


Diphenhyd/Lidocaine/MagAl/Antonio [First-Mouthwash BLM Susp] 5 ml SSPIT QID 04/04/ 19 [History]


Gemcitabine HCl 800 mg IV ASDIRECTED 04/04/19 [History]


Morphine [MS Contin] 15 mg PO Q12HR 04/04/19 [History]


PACLitaxel Protein-Bound [Abraxane] 298 mg IV ASDIRECTED 04/04/19 [History]


atorvaSTATin [Lipitor] 10 mg PO BEDTIME 04/04/19 [History]


Amoxicillin/Potassium Clav [Augmentin 875-125 Tablet] 1 each PO BID #8 tablet 04 /06/19 [Rx]


Lactobacillus Rhamnosus GG [Culturelle] 1 cap PO BID #60 cap 04/06/19 [Rx]








Referrals: 


Maksim Perez, NP [Primary Care Provider] - 


(Keep appointment with Shannan Nicole on April 9th at 9AM





Follow up appointment with Maksim Perez on April 9th at 10AM 





Both at [facility])





- Discharge Summary/Plan Comment


DC Time >30 min.: No





- Patient Data


Vitals - Most Recent: 


 Last Vital Signs











Temp  97.1 F   04/06/19 10:28


 


Pulse  84   04/06/19 10:28


 


Resp  16   04/06/19 10:28


 


BP  97/57 L  04/06/19 10:28


 


Pulse Ox  96   04/06/19 10:28











Weight - Most Recent: 220 lb 5.985 oz


I&O - Last 24 hours: 


 Intake & Output











 04/05/19 04/06/19 04/06/19





 22:59 06:59 14:59


 


Intake Total 1852 350 480


 


Output Total 500  300


 


Balance 1352 350 180











Lab Results - Last 24 hrs: 


 Laboratory Results - last 24 hr











  04/06/19 04/06/19 Range/Units





  05:00 05:00 


 


WBC  9.3   (4.5-11.0)  K/uL


 


RBC  2.61 L   (4.30-5.90)  M/uL


 


Hgb  8.1 L   (12.0-15.0)  g/dL


 


Hct  25.5 L   (40.0-54.0)  %


 


MCV  98   (80-98)  fL


 


MCH  31   (27-31)  pg


 


MCHC  32   (32-36)  %


 


Plt Count  222   (150-400)  K/uL


 


Add Manual Diff  Yes   


 


Neutrophils % (Manual)  75 H   (36-66)  %


 


Band Neutrophils %  3 L   (5-11)  %


 


Lymphocytes % (Manual)  15 L   (24-44)  %


 


Monocytes % (Manual)  4   (2-6)  %


 


Eosinophils % (Manual)  2   (2-4)  %


 


Sodium   141  (140-148)  mmol/L


 


Potassium   3.6  (3.6-5.2)  mmol/L


 


Chloride   109 H  (100-108)  mmol/L


 


Carbon Dioxide   26  (21-32)  mmol/L


 


Anion Gap   9.6  (5.0-14.0)  mmol/L


 


BUN   11  (7-18)  mg/dL


 


Creatinine   0.6 L  (0.8-1.3)  mg/dL


 


Est Cr Clr Drug Dosing   138.96  mL/min


 


Estimated GFR (MDRD)   > 60  (>60)  


 


Glucose   115 H  ()  mg/dL


 


Calcium   7.9 L  (8.5-10.1)  mg/dL


 


Magnesium   2.1  (1.8-2.4)  mg/dL











VIDYA Results - Last 24 hrs: 


 Microbiology











 04/04/19 03:50 Gram Stain - Final





 Ascities Fluid Body Fluid Culture - Preliminary


 


 04/03/19 23:55 Aerobic Blood Culture - Preliminary





 Blood - Port-A-Cath    NO GROWTH AFTER 2 DAYS





 Anaerobic Blood Culture - Preliminary





    NO GROWTH AFTER 2 DAYS


 


 04/03/19 00:05 Aerobic Blood Culture - Preliminary





 Blood - Arm, Left    NO GROWTH AFTER 2 DAYS





 Anaerobic Blood Culture - Preliminary





    NO GROWTH AFTER 2 DAYS











Med Orders - Current: 


 Current Medications





Acetaminophen (Tylenol)  650 mg PO Q4H PRN


   PRN Reason: Pain (Mild 1-3)/fever


   Last Admin: 04/05/19 04:05 Dose:  650 mg


Albuterol (Proventil Neb Soln)  2.5 mg NEB Q4H PRN


   PRN Reason: Shortness Of Breath/wheezing


Albuterol/Ipratropium (Duoneb 3.0-0.5 Mg/3 Ml)  3 ml NEB QID PRN


   PRN Reason: Shortness Of Breath/wheezing


Lipase/Protease/Amylase (Creon Dr 12,000 Units)  1 cap PO TIDAC [facility]


   Last Admin: 04/06/19 11:28 Dose:  1 cap


Bisacodyl (Dulcolax)  5 mg PO DAILY PRN


   PRN Reason: Constipation


Lidocaine HCl 30 ml/ Al Hydroxide/Mg Hydroxide 30 ml/Diphenhydramine HCl 75 mg  

0 ml MUCMEM QID [facility]


   Last Admin: 04/06/19 10:17 Dose:  5 ml


Cyanocobalamin (Vitamin B12)  2,500 mcg PO DAILY [facility]


   Last Admin: 04/06/19 08:12 Dose:  Not Given


Cyclobenzaprine HCl (Flexeril)  10 mg PO TID PRN


   PRN Reason: Muscle Spasm


Docusate Sodium (Colace)  100 mg PO BID PRN


   PRN Reason: Constipation


Duloxetine HCl (Cymbalta)  60 mg PO BID [facility]


   Last Admin: 04/06/19 08:06 Dose:  Not Given


Enoxaparin Sodium (Lovenox)  40 mg SUBCUT DAILY [facility]


   Last Admin: 04/06/19 08:06 Dose:  Not Given


Gabapentin (Neurontin)  600 mg PO TID [facility]


   Last Admin: 04/06/19 08:07 Dose:  Not Given


Heparin Sodium (Porcine) (Heparin Lock Flush 100 Units/Ml)  500 units FLUSH 

ASDIRECTED PRN


   PRN Reason: IV Use


   Last Admin: 04/06/19 11:29 Dose:  500 units


Vancomycin HCl 1.5 gm/ Sodium (Chloride)  250 mls @ 165 mls/hr IV Q12H [facility]


   Last Admin: 04/05/19 23:00 Dose:  165 mls/hr


Piperacillin/Tazobactam/ (Dextrose 3.375 gm/ Premix)  50 mls @ 100 mls/hr IV 

Q6H [facility]


   Last Admin: 04/06/19 05:33 Dose:  100 mls/hr


Lactobacillus Rhamnosus (Culturelle)  1 cap PO BID [facility]


   Last Admin: 04/06/19 08:06 Dose:  Not Given


Levothyroxine Sodium (Synthroid)  100 mcg PO ACBREAKFAST [facility]


   Last Admin: 04/06/19 07:34 Dose:  100 mcg


Levothyroxine Sodium (Levothyroxine)  25 mcg PO ACBREAKFAST [facility]


   Last Admin: 04/06/19 07:33 Dose:  25 mcg


Loratadine (Claritin Reditabs)  10 mg PO DAILY PRN


   PRN Reason: Other


   Last Admin: 04/04/19 18:18 Dose:  10 mg


Lorazepam (Ativan)  1 mg IV Q6H PRN


   PRN Reason: Nausea/Vomiting


Magnesium Oxide (Magnesium Oxide)  400 mg PO BID [facility]


   Last Admin: 04/06/19 08:07 Dose:  Not Given


Melatonin (Melatonin)  6 mg PO BEDTIME PRN


   PRN Reason: Insomnia


Morphine Sulfate (Morphine)  2 mg IVPUSH Q2H PRN


   PRN Reason: Pain (severe 7-10)


   Last Admin: 04/05/19 07:43 Dose:  2 mg


Morphine Sulfate (Ms Contin)  15 mg PO Q12H [facility]


   Last Admin: 04/06/19 05:33 Dose:  15 mg


Ondansetron HCl (Zofran Odt)  4 mg PO Q6H PRN


   PRN Reason: Nausea able to take PO


Oxycodone HCl (Oxycodone)  10 mg PO Q4H PRN


   PRN Reason: Pain (moderate 4-6)


   Last Admin: 04/06/19 07:43 Dose:  10 mg


Pantoprazole Sodium (Protonix***)  40 mg PO BIDAC [facility]


   Last Admin: 04/06/19 07:33 Dose:  40 mg


Polyethylene Glycol (Miralax)  17 gm PO QPM [facility]


   Last Admin: 04/05/19 16:17 Dose:  17 gm


Ropinirole HCl (Requip)  1 mg PO BEDTIME [facility]


   Last Admin: 04/05/19 20:47 Dose:  1 mg


Sodium Chloride (Saline Flush)  10 ml FLUSH DAILY [facility]


   Last Admin: 04/06/19 08:07 Dose:  Not Given


Venlafaxine HCl (Effexor)  75 mg PO BID [facility]


   Last Admin: 04/06/19 08:06 Dose:  Not Given





Discontinued Medications





Acetaminophen (Tylenol Extra Strength)  1,000 mg PO ONETIME ONE


   Stop: 04/03/19 22:58


   Last Admin: 04/03/19 23:20 Dose:  1,000 mg


Lactated Ringer's (Ringers, Lactated)  1,000 mls @ 1,000 mls/hr IV BOLUS ONE


   Stop: 04/03/19 23:48


   Last Admin: 04/04/19 05:04 Dose:  Not Given


Vancomycin HCl 1.75 gm/ Sodium (Chloride)  250 mls @ 150 mls/hr IV NOW STA


   Stop: 04/04/19 02:41


   Last Admin: 04/04/19 01:19 Dose:  150 mls/hr


Sodium Chloride (Normal Saline)  1,000 mls @ 125 mls/hr IV ONETIME ONE


   Stop: 04/04/19 11:10


   Last Admin: 04/04/19 03:12 Dose:  125 mls/hr


Sodium Chloride (Normal Saline)  1,000 mls @ 125 mls/hr IV ASDIRECTED [facility]


   Last Admin: 04/05/19 08:50 Dose:  125 mls/hr


Sodium Chloride (Normal Saline)  86 mls @ 3.5 mls/sec IV ONETIME ONE


   Stop: 04/04/19 10:37


   Last Admin: 04/04/19 11:17 Dose:  3.5 mls/sec


Magnesium Sulfate 2 gm/ Premix  50 mls @ 25 mls/hr IV Q6H [facility]


   Stop: 04/05/19 22:59


   Last Admin: 04/05/19 20:46 Dose:  25 mls/hr


Iopamidol (Isovue-300 (61%))  150 ml IV .AS DIRECTED [facility]


   Stop: 04/04/19 12:00


   Last Admin: 04/04/19 11:17 Dose:  150 ml


Morphine Sulfate (Ms Contin)  15 mg PO Q12H [facility]


   Last Admin: 04/04/19 05:44 Dose:  15 mg


Non-Formulary Medication (Levothyroxine [Levothyroxine])  125 mcg PO ACBRK [facility]


Ondansetron HCl (Zofran Odt)  4 mg PO ONETIME ONE


   Stop: 04/03/19 22:28


   Last Admin: 04/04/19 05:03 Dose:  Not Given


Oxycodone HCl (Oxycodone)  10 mg PO ONETIME ONE


   Stop: 04/03/19 22:58


   Last Admin: 04/03/19 23:21 Dose:  10 mg


Pantoprazole Sodium (Protonix Iv***)  40 mg IVPUSH Q12H [facility]


   Last Admin: 04/04/19 05:43 Dose:  40 mg


Polyethylene Glycol (Miralax)  17 gm PO QPM [facility]


Potassium Chloride (Klor-Con M20)  40 meq PO ONETIME ONE


   Stop: 04/05/19 10:01


   Last Admin: 04/05/19 10:28 Dose:  40 meq


Sertraline HCl (Zoloft)  100 mg PO DAILY [facility]


   Last Admin: 04/04/19 08:31 Dose:  100 mg


Sodium Chloride (Saline Flush)  10 ml FLUSH ONETIME ONE


   Stop: 04/04/19 10:37


   Last Admin: 04/04/19 11:17 Dose:  10 ml


Tbo-Filgrastim (Granix)  480 mcg SUBCUT ONETIME ONE


   Stop: 04/04/19 11:31


   Last Admin: 04/04/19 11:55 Dose:  480 mcg


Venlafaxine HCl (Effexor)  75 mg PO DAILY [facility]


   Last Admin: 04/04/19 08:31 Dose:  75 mg











- Exam


General: Reports: Alert, Oriented, Cooperative, No Acute Distress


Lungs: Reports: Clear to Auscultation, Normal Respiratory Effort


Cardiovascular: Reports: Regular Rate, Regular Rhythm, No Murmurs


GI/Abdominal Exam: Soft, No Organomegaly, Tender.  No: Guarding, Rigid, Rebound


Extremities: Non-Tender, No Pedal Edema